# Patient Record
Sex: FEMALE | Race: WHITE
[De-identification: names, ages, dates, MRNs, and addresses within clinical notes are randomized per-mention and may not be internally consistent; named-entity substitution may affect disease eponyms.]

---

## 2017-04-11 ENCOUNTER — HOSPITAL ENCOUNTER (INPATIENT)
Dept: HOSPITAL 61 - ER | Age: 42
LOS: 2 days | Discharge: HOME | DRG: 385 | End: 2017-04-13
Attending: INTERNAL MEDICINE | Admitting: INTERNAL MEDICINE
Payer: COMMERCIAL

## 2017-04-11 VITALS — HEIGHT: 62 IN | BODY MASS INDEX: 21.53 KG/M2 | WEIGHT: 117 LBS

## 2017-04-11 DIAGNOSIS — K64.9: ICD-10-CM

## 2017-04-11 DIAGNOSIS — F41.9: ICD-10-CM

## 2017-04-11 DIAGNOSIS — E87.6: ICD-10-CM

## 2017-04-11 DIAGNOSIS — Z90.49: ICD-10-CM

## 2017-04-11 DIAGNOSIS — K50.10: Primary | ICD-10-CM

## 2017-04-11 DIAGNOSIS — Z98.51: ICD-10-CM

## 2017-04-11 DIAGNOSIS — E86.0: ICD-10-CM

## 2017-04-11 DIAGNOSIS — K21.9: ICD-10-CM

## 2017-04-11 DIAGNOSIS — Z90.79: ICD-10-CM

## 2017-04-11 DIAGNOSIS — N20.0: ICD-10-CM

## 2017-04-11 DIAGNOSIS — F32.9: ICD-10-CM

## 2017-04-11 DIAGNOSIS — N17.0: ICD-10-CM

## 2017-04-11 DIAGNOSIS — F17.210: ICD-10-CM

## 2017-04-11 LAB
ALBUMIN SERPL-MCNC: 3.6 G/DL (ref 3.4–5)
ALBUMIN/GLOB SERPL: 0.8 {RATIO} (ref 1–1.7)
ALP SERPL-CCNC: 132 U/L (ref 46–116)
ALT SERPL-CCNC: 19 U/L (ref 14–59)
ANION GAP SERPL CALC-SCNC: 18 MMOL/L (ref 6–14)
AST SERPL-CCNC: 22 U/L (ref 15–37)
BACTERIA #/AREA URNS HPF: (no result) /HPF
BASOPHILS # BLD AUTO: 0.1 X10^3/UL (ref 0–0.2)
BASOPHILS NFR BLD: 1 % (ref 0–3)
BILIRUB SERPL-MCNC: 0.4 MG/DL (ref 0.2–1)
BILIRUB UR QL STRIP: (no result)
BUN SERPL-MCNC: 12 MG/DL (ref 7–20)
BUN/CREAT SERPL: 7 (ref 6–20)
CALCIUM SERPL-MCNC: 9.1 MG/DL (ref 8.5–10.1)
CHLORIDE SERPL-SCNC: 94 MMOL/L (ref 98–107)
CO2 SERPL-SCNC: 21 MMOL/L (ref 21–32)
CREAT SERPL-MCNC: 1.8 MG/DL (ref 0.6–1)
EOSINOPHIL NFR BLD: 0 % (ref 0–3)
ERYTHROCYTE [DISTWIDTH] IN BLOOD BY AUTOMATED COUNT: 15.7 % (ref 11.5–14.5)
GFR SERPLBLD BASED ON 1.73 SQ M-ARVRAT: 31.2 ML/MIN
GLOBULIN SER-MCNC: 4.6 G/DL (ref 2.2–3.8)
GLUCOSE SERPL-MCNC: 89 MG/DL (ref 70–99)
GLUCOSE UR STRIP-MCNC: NEGATIVE MG/DL
HCT VFR BLD CALC: 47 % (ref 36–47)
HGB BLD-MCNC: 15.5 G/DL (ref 12–15.5)
LYMPHOCYTES # BLD: 2.2 X10^3/UL (ref 1–4.8)
LYMPHOCYTES NFR BLD AUTO: 22 % (ref 24–48)
MCH RBC QN AUTO: 30 PG (ref 25–35)
MCHC RBC AUTO-ENTMCNC: 33 G/DL (ref 31–37)
MCV RBC AUTO: 90 FL (ref 79–100)
MONOCYTES NFR BLD: 10 % (ref 0–9)
NEG OBC SER: (no result)
NEUTROPHILS NFR BLD AUTO: 67 % (ref 31–73)
NITRITE UR QL STRIP: NEGATIVE
PH UR STRIP: 6.5 [PH]
PLATELET # BLD AUTO: 495 X10^3/UL (ref 140–400)
POS OBC SER: (no result)
POTASSIUM SERPL-SCNC: 2.5 MMOL/L (ref 3.5–5.1)
PROT SERPL-MCNC: 8.2 G/DL (ref 6.4–8.2)
PROT UR STRIP-MCNC: NEGATIVE MG/DL
RBC # BLD AUTO: 5.23 X10^6/UL (ref 3.5–5.4)
RBC #/AREA URNS HPF: (no result) /HPF (ref 0–2)
SODIUM SERPL-SCNC: 133 MMOL/L (ref 136–145)
SP GR UR STRIP: 1.01
SQUAMOUS #/AREA URNS LPF: (no result) /LPF
UROBILINOGEN UR-MCNC: 0.2 MG/DL
WBC # BLD AUTO: 9.7 X10^3/UL (ref 4–11)
WBC #/AREA URNS HPF: (no result) /HPF (ref 0–4)

## 2017-04-11 PROCEDURE — 83690 ASSAY OF LIPASE: CPT

## 2017-04-11 PROCEDURE — 85027 COMPLETE CBC AUTOMATED: CPT

## 2017-04-11 PROCEDURE — 74022 RADEX COMPL AQT ABD SERIES: CPT

## 2017-04-11 PROCEDURE — 96376 TX/PRO/DX INJ SAME DRUG ADON: CPT

## 2017-04-11 PROCEDURE — 84703 CHORIONIC GONADOTROPIN ASSAY: CPT

## 2017-04-11 PROCEDURE — 87086 URINE CULTURE/COLONY COUNT: CPT

## 2017-04-11 PROCEDURE — 85007 BL SMEAR W/DIFF WBC COUNT: CPT

## 2017-04-11 PROCEDURE — 96361 HYDRATE IV INFUSION ADD-ON: CPT

## 2017-04-11 PROCEDURE — 74176 CT ABD & PELVIS W/O CONTRAST: CPT

## 2017-04-11 PROCEDURE — 80053 COMPREHEN METABOLIC PANEL: CPT

## 2017-04-11 PROCEDURE — 96375 TX/PRO/DX INJ NEW DRUG ADDON: CPT

## 2017-04-11 PROCEDURE — 96374 THER/PROPH/DIAG INJ IV PUSH: CPT

## 2017-04-11 PROCEDURE — 87045 FECES CULTURE AEROBIC BACT: CPT

## 2017-04-11 PROCEDURE — 87324 CLOSTRIDIUM AG IA: CPT

## 2017-04-11 PROCEDURE — 81001 URINALYSIS AUTO W/SCOPE: CPT

## 2017-04-11 PROCEDURE — 81025 URINE PREGNANCY TEST: CPT

## 2017-04-11 PROCEDURE — 36415 COLL VENOUS BLD VENIPUNCTURE: CPT

## 2017-04-11 RX ADMIN — BACITRACIN SCH MLS/HR: 5000 INJECTION, POWDER, FOR SOLUTION INTRAMUSCULAR at 22:24

## 2017-04-12 VITALS — SYSTOLIC BLOOD PRESSURE: 102 MMHG | DIASTOLIC BLOOD PRESSURE: 73 MMHG

## 2017-04-12 VITALS — SYSTOLIC BLOOD PRESSURE: 96 MMHG | DIASTOLIC BLOOD PRESSURE: 63 MMHG

## 2017-04-12 VITALS — DIASTOLIC BLOOD PRESSURE: 81 MMHG | SYSTOLIC BLOOD PRESSURE: 126 MMHG

## 2017-04-12 VITALS — SYSTOLIC BLOOD PRESSURE: 110 MMHG | DIASTOLIC BLOOD PRESSURE: 74 MMHG

## 2017-04-12 VITALS — SYSTOLIC BLOOD PRESSURE: 86 MMHG | DIASTOLIC BLOOD PRESSURE: 50 MMHG

## 2017-04-12 VITALS — DIASTOLIC BLOOD PRESSURE: 59 MMHG | SYSTOLIC BLOOD PRESSURE: 96 MMHG

## 2017-04-12 VITALS — DIASTOLIC BLOOD PRESSURE: 64 MMHG | SYSTOLIC BLOOD PRESSURE: 96 MMHG

## 2017-04-12 VITALS — SYSTOLIC BLOOD PRESSURE: 93 MMHG | DIASTOLIC BLOOD PRESSURE: 54 MMHG

## 2017-04-12 VITALS — DIASTOLIC BLOOD PRESSURE: 62 MMHG | SYSTOLIC BLOOD PRESSURE: 95 MMHG

## 2017-04-12 VITALS — DIASTOLIC BLOOD PRESSURE: 60 MMHG | SYSTOLIC BLOOD PRESSURE: 91 MMHG

## 2017-04-12 LAB
ALBUMIN SERPL-MCNC: 2.2 G/DL (ref 3.4–5)
ALBUMIN SERPL-MCNC: 2.5 G/DL (ref 3.4–5)
ALBUMIN/GLOB SERPL: 0.7 {RATIO} (ref 1–1.7)
ALBUMIN/GLOB SERPL: 0.8 {RATIO} (ref 1–1.7)
ALP SERPL-CCNC: 80 U/L (ref 46–116)
ALP SERPL-CCNC: 90 U/L (ref 46–116)
ALT SERPL-CCNC: 12 U/L (ref 14–59)
ALT SERPL-CCNC: 14 U/L (ref 14–59)
ANION GAP SERPL CALC-SCNC: 14 MMOL/L (ref 6–14)
ANION GAP SERPL CALC-SCNC: 9 MMOL/L (ref 6–14)
AST SERPL-CCNC: 15 U/L (ref 15–37)
AST SERPL-CCNC: 16 U/L (ref 15–37)
BASOPHILS # BLD AUTO: 0.1 X10^3/UL (ref 0–0.2)
BASOPHILS NFR BLD: 1 % (ref 0–3)
BILIRUB SERPL-MCNC: 0.2 MG/DL (ref 0.2–1)
BILIRUB SERPL-MCNC: 0.3 MG/DL (ref 0.2–1)
BUN SERPL-MCNC: 11 MG/DL (ref 7–20)
BUN SERPL-MCNC: 7 MG/DL (ref 7–20)
BUN/CREAT SERPL: 7 (ref 6–20)
BUN/CREAT SERPL: 8 (ref 6–20)
CALCIUM SERPL-MCNC: 7.4 MG/DL (ref 8.5–10.1)
CALCIUM SERPL-MCNC: 7.5 MG/DL (ref 8.5–10.1)
CHLORIDE SERPL-SCNC: 103 MMOL/L (ref 98–107)
CHLORIDE SERPL-SCNC: 109 MMOL/L (ref 98–107)
CO2 SERPL-SCNC: 18 MMOL/L (ref 21–32)
CO2 SERPL-SCNC: 21 MMOL/L (ref 21–32)
CREAT SERPL-MCNC: 1 MG/DL (ref 0.6–1)
CREAT SERPL-MCNC: 1.3 MG/DL (ref 0.6–1)
EOSINOPHIL NFR BLD: 1 % (ref 0–3)
ERYTHROCYTE [DISTWIDTH] IN BLOOD BY AUTOMATED COUNT: 15.2 % (ref 11.5–14.5)
GFR SERPLBLD BASED ON 1.73 SQ M-ARVRAT: 45.4 ML/MIN
GFR SERPLBLD BASED ON 1.73 SQ M-ARVRAT: 61.4 ML/MIN
GLOBULIN SER-MCNC: 3 G/DL (ref 2.2–3.8)
GLOBULIN SER-MCNC: 3.3 G/DL (ref 2.2–3.8)
GLUCOSE SERPL-MCNC: 68 MG/DL (ref 70–99)
GLUCOSE SERPL-MCNC: 80 MG/DL (ref 70–99)
HCT VFR BLD CALC: 36 % (ref 36–47)
HGB BLD-MCNC: 11.8 G/DL (ref 12–15.5)
LYMPHOCYTES # BLD: 2.6 X10^3/UL (ref 1–4.8)
LYMPHOCYTES NFR BLD AUTO: 33 % (ref 24–48)
MCH RBC QN AUTO: 30 PG (ref 25–35)
MCHC RBC AUTO-ENTMCNC: 33 G/DL (ref 31–37)
MCV RBC AUTO: 91 FL (ref 79–100)
MONOCYTES NFR BLD: 13 % (ref 0–9)
NEUTROPHILS NFR BLD AUTO: 52 % (ref 31–73)
PLATELET # BLD AUTO: 345 X10^3/UL (ref 140–400)
POTASSIUM SERPL-SCNC: 3.2 MMOL/L (ref 3.5–5.1)
POTASSIUM SERPL-SCNC: 3.4 MMOL/L (ref 3.5–5.1)
PROT SERPL-MCNC: 5.2 G/DL (ref 6.4–8.2)
PROT SERPL-MCNC: 5.8 G/DL (ref 6.4–8.2)
RBC # BLD AUTO: 3.97 X10^6/UL (ref 3.5–5.4)
SODIUM SERPL-SCNC: 135 MMOL/L (ref 136–145)
SODIUM SERPL-SCNC: 139 MMOL/L (ref 136–145)
WBC # BLD AUTO: 7.7 X10^3/UL (ref 4–11)

## 2017-04-12 RX ADMIN — ESCITALOPRAM OXALATE SCH MG: 5 TABLET, FILM COATED ORAL at 08:53

## 2017-04-12 RX ADMIN — NICOTINE SCH PATCH: 14 PATCH, EXTENDED RELEASE TOPICAL at 01:30

## 2017-04-12 RX ADMIN — Medication SCH TAB: at 09:00

## 2017-04-12 RX ADMIN — METHYLPREDNISOLONE SODIUM SUCCINATE SCH MG: 40 INJECTION, POWDER, FOR SOLUTION INTRAMUSCULAR; INTRAVENOUS at 15:43

## 2017-04-12 RX ADMIN — ONDANSETRON SCH MG: 4 TABLET, ORALLY DISINTEGRATING ORAL at 06:00

## 2017-04-12 RX ADMIN — PREDNISONE SCH MG: 20 TABLET ORAL at 09:00

## 2017-04-12 RX ADMIN — ALPRAZOLAM SCH MG: 0.25 TABLET ORAL at 08:53

## 2017-04-12 RX ADMIN — Medication SCH TAB: at 20:59

## 2017-04-12 RX ADMIN — BACITRACIN SCH MLS/HR: 5000 INJECTION, POWDER, FOR SOLUTION INTRAMUSCULAR at 05:21

## 2017-04-12 RX ADMIN — BACITRACIN SCH MLS/HR: 5000 INJECTION, POWDER, FOR SOLUTION INTRAMUSCULAR at 15:42

## 2017-04-12 RX ADMIN — ONDANSETRON SCH MG: 4 TABLET, ORALLY DISINTEGRATING ORAL at 12:30

## 2017-04-12 RX ADMIN — METHYLPREDNISOLONE SODIUM SUCCINATE SCH MG: 40 INJECTION, POWDER, FOR SOLUTION INTRAMUSCULAR; INTRAVENOUS at 20:58

## 2017-04-12 RX ADMIN — NICOTINE SCH PATCH: 14 PATCH, EXTENDED RELEASE TOPICAL at 08:54

## 2017-04-12 RX ADMIN — ONDANSETRON SCH MG: 4 TABLET, ORALLY DISINTEGRATING ORAL at 17:38

## 2017-04-12 NOTE — ACF
Admission Forms Criteria


                                                                         


                           ABDOMINAL PAIN





Clinical Indications for Admission to Inpatient Care


 


                                                                               

      (Place 'X' for any and all applicable criteria):





Admission is indicated for ANY ONE of the following(1)(2)(3)(4)(5):


[X]I.      Inpatient admission required rather than observation care (Also use 

Abdominal Pain: Observation Care, 


           as appropriate) because of ANY ONE of the following:


            [ ]a)   Severe pain requiring acute inpatient management


            [ ]b)    Identification of etiology/finding that requires inpatient 

care (eg, aortic dissection, free air)


            [ ]c)    Absent bowel sounds with complete ileus(6)  


            [ ]d)    Suspected toxic megacolon


            [X]e)    Severe electrolyte abnormalities requiring inpatient care


            [ ]f)     High fever or infection requiring inpatient admission as 

indicated by ANY ONE of following(7)(8):


                       [ ] i)    Appropriate outpatient or observational care 

antimicrobial treatment unavailable, not effective, or not feasible


                       [ ] ii)   Documented bacteremia 


                       [ ] iii)  Temperature > 104.9 degrees F (oral)


                       [ ] iv)  T >103.1 F (oral) or < 96.8 F(rectal) that does 

not respond to all emergency treatment measures


            [ ]g)     Signs of intestinal obstruction [B] 


            [ ]h)     Hemodynamic instability


            [ ]i)      IV fluid to replace significant ongoing losses (greater 

than 3 L/m2 per day) (12)(13)


            [ ]j)      Percutaneous or open drainage (eg, abscess, biliary tract

) procedures


            [ ]k)     Parenteral nutrition regimen that must be implemented on 

inpatient basis   


            [ ]l)      Other condition,treatment or monitoring requiring 

inpatient admission.


[ ]II.      Peritoneal signs present


[ ]III.     Surgery needed that cannot be performed on an ambulatory basis.


[ ]IV.    Evaluation requires patient to not eat or drink for extended period (

eg, more than 24 hours).





[ ]V.     Contraindications and/or Inappropriate clinical situations for 

Observational Care in patients with


           abdominal pain, when ANY ONE of the following is required:


           [ ]a)  Thorough evaluation is required to prevent catastrophic 

events due to delays in diagnosing  


                   (e.g.Mesenteric ischemia) 1,3


           [ ]b)  Patient with severe pathology or with chronic symptoms 

unlikely to improve in the ED stay (3)


[ ]VI.    General contraindications and/or Inappropriate clinical situations 

for Observational Care in patients


           with abdominal pain, when ANY ONE of the following is required:


           [ ]a)   Prediction of prolongation of LOS based on ANY ONE of the 

following may be considered as 


                    a contraindication for observational care 2, 3, 4, 5, 6, 7, 

8, 9, 10, 11


                    [ ]i)    Age > 65 yrs.


                    [ ]ii)   Patient arriving by ambulance


                    [ ]iii)  Patient with high acuity


                    [ ]iv)  Patient requiring vital sign monitoring


                    [ ]v)   Patient on IV medication


           [ ]b)   Systolic blood pressures  180mmHg 3,12


           [ ]c)   Patient with altered mental status including delirium and 

other alteration of consciousness, (3)


           [ ]d)   Patient whose discharge disposition will be to a skilled 

nursing home or rehabilitation home should 


                    not be managed in Emergency Department Observation Unit. 

CMS rule requires 3 days hospital stay before such placement.3,13


           [ ]e)   Patient with failure to thrive due to broad array of 

etiologies 3,16,17


           [ ]f)    Inability to ambulate 3,14








Extended stay beyond goal length of stay may be needed for(2)(3):


[ ]a)   Persistent abdominal pain with suspected intra-abdominal process


[ ]b)   Diagnosed condition requiring continued stay (e.g., pancreatitis, 

complicated diverticulitis)                                            


[ ]c)   Surgery (e.g., colectomy)                


    





The original [employer] content created by [employer] has been revised. 


The portions of the content which have been revised are identified through the 

use of italic text or in bold, and [facility] 


has neither reviewed nor approved the modified material.All other unmodified 

content is copyright  [employer].





Please see references footnoted in the original [employer] edition 

2016


Admission Criteria Met?:  Yes








SADA SUAREZ Apr 12, 2017 00:31

## 2017-04-12 NOTE — PHYS DOC
Past Medical History


Past Medical History:  Other


Additional Past Medical Histor:  CROHNS


Past Surgical History:  Colectomy, Other


Additional Past Surgical Histo:  BOWEL RESECTION


Alcohol Use:  Heavy


Drug Use:  None





Adult General


Chief Complaint


Chief Complaint:  ABDOMINAL PAIN





HPI


HPI


Patient is a 41  year old female who has a history significant for Crohn's 

colitis who presents here today complaining of pain in her stomach. Patient 

denies any history of hypertension diabetes CHF COPD liver longer kidney 

problems. Patient is status post a colectomy and resection as well as a 

bilateral tubal ligation. Patient reports she smokes and drinks but does not do 

any drugs. Patient is not allergic to any medications. Patient reports the pain 

started approximately 1-2 days ago is been getting progressively worse. She 

denies any fevers shakes chills. Patient denies any diarrhea that's out of the 

ordinary for her. Patient reports that she usually has loose stools. Patient 

reports she's had nausea and vomiting. Patient denies any melena. Patient does 

report occasional slight amount of blood in her stools which she thinks is from 

her hemorrhoids. Patient denies any hematemesis. Patient has a dysuria 

frequency or urgency. Patient denies any URI symptoms. Patient reports that she 

has been unable to keep down much liquids or food over the last several days.





Patient's physical exam was significant for diffuse mild tenderness to 

palpation. Patient had no rebound or guarding. Patient has normal active bowel 

sounds. Patient was her  signs. Patient does not present with any signs 

or symptoms of be consistent with an acute surgical abdomen. Patient's mucous 

membranes are moist. Patient is nontoxic appearing.





She's ER workup was significant for labs revealed a significant hypokalemia. 

Patient's potassium level was 2.3.


Patient's x-rays were unremarkable. There was no free air or air-fluid levels. 

There was a paucity of gas on the x-ray.





A/P #1 Crohn's colitis exacerbation. Patient is clinically hemodynamically 

stable. Patient was given pain medicines in the ED with some resolution of her 

discomfort. Patient did have a significant hypokalemia that will need to be 

treated aggressively in the ED as well as inpatient. Patient's potassium level 

was 2.3 and she did complain of some generalized weakness. Patient was given by 

mouth potassium as well as repleted through IV potassium as well. Patient be 

admitted to the hospital on, treated for further repletion of her potassium and 

monitoring and treatment of her abdominal pain and Crohn's colitis.





Review of Systems


Review of Systems





Constitutional: Denies fever or chills []


Eyes: Denies change in visual acuity, redness, or eye pain []


All other review systems are negative except as documented in the history of 

present illness portion.





Current Medications


Current Medications





 Current Medications








 Medications


  (Trade)  Dose


 Ordered  Sig/Eugenio  Start Time


 Stop Time Status Last Admin


Dose Admin


 


 Hydromorphone HCl


 1 mg  1 mg  1X  ONCE  4/11/17 21:45


 4/11/17 21:46 DC 4/11/17 21:47


1 MG


 


 Ondansetron HCl


  (Zofran)  4 mg  1X  ONCE  4/11/17 20:45


 4/11/17 21:12 DC 4/11/17 21:18


4 MG


 


 Potassium


 Chloride/Sodium


 Chloride


  (KCl 40 Meq-NS


 1,000 ml Iv Soln)  1,000 ml @ 


 75 mls/hr  1X  ONCE  4/11/17 22:00


 4/12/17 11:19  4/11/17 22:22


75 MLS/HR


 


 Sodium Chloride


  (Iv Sodium


 Chloride 0.9%


 1000ml Bag)  1,000 ml @ 


 1,000 mls/hr  Q1H  4/11/17 21:08


 4/11/17 22:07 DC 4/11/17 21:17


1,000 MLS/HR











Allergies


Allergies





 Allergies








Coded Allergies Type Severity Reaction Last Updated Verified


 


  No Known Drug Allergies    7/1/16 No











Physical Exam


Physical Exam





Constitutional: Well developed, well nourished, no acute distress, non-toxic 

appearance. []


HENT: Normocephalic, atraumatic, bilateral external ears normal, oropharynx 

moist, no oral exudates, nose normal. []


Eyes: PERRLA, EOMI, conjunctiva normal, no discharge. [] 


Neck: Normal range of motion, no tenderness, supple, no stridor. [] 


Cardiovascular:Heart rate regular rhythm, no murmur []


Lungs & Thorax:  Bilateral breath sounds clear to auscultation []


Back: No tenderness, no CVA tenderness. [] 


Extremities: No tenderness, no cyanosis, no clubbing, ROM intact, no edema. [] 


Neurologic: Alert and oriented X 3, normal motor function, normal sensory 

function, no focal deficits noted. []


Psychologic: Affect normal, judgement normal, mood normal. []





Current Patient Data


Vital Signs





 Vital Signs








  Date Time  Temp Pulse Resp B/P Pulse Ox O2 Delivery O2 Flow Rate FiO2


 


4/11/17 21:58  95  135/87 98 Room Air  


 


4/11/17 21:47   16     


 


4/11/17 20:00 96.6       





 96.6       








Lab Values





 Laboratory Tests








Test


  4/11/17


20:36


 


White Blood Count


  9.7x10^3/uL


(4.0-11.0)


 


Red Blood Count


  5.23x10^6/uL


(3.50-5.40)


 


Hemoglobin


  15.5g/dL


(12.0-15.5)


 


Hematocrit


  47.0%


(36.0-47.0)


 


Mean Corpuscular Volume 90fL ()  


 


Mean Corpuscular Hemoglobin 30pg (25-35)  


 


Mean Corpuscular Hemoglobin


Concent 33g/dL (31-37)


 


 


Red Cell Distribution Width


  15.7%


(11.5-14.5)  H


 


Platelet Count


  495x10^3/uL


(140-400)  H


 


Neutrophils (%) (Auto) 67% (31-73)  


 


Lymphocytes (%) (Auto) 22% (24-48)  L


 


Monocytes (%) (Auto) 10% (0-9)  H


 


Eosinophils (%) (Auto) 0% (0-3)  


 


Basophils (%) (Auto) 1% (0-3)  


 


Neutrophils # (Auto)


  6.5x10^3uL


(1.8-7.7)


 


Lymphocytes # (Auto)


  2.2x10^3/uL


(1.0-4.8)


 


Monocytes # (Auto)


  0.9x10^3/uL


(0.0-1.1)


 


Eosinophils # (Auto)


  0.0x10^3/uL


(0.0-0.7)


 


Basophils # (Auto)


  0.1x10^3/uL


(0.0-0.2)


 


Urine Collection Type Unknown  


 


Urine Color Yellow  


 


Urine Clarity Clear  


 


Urine pH 6.5  


 


Urine Specific Gravity 1.010  


 


Urine Protein


  Negativemg/dL


(NEG-TRACE)


 


Urine Glucose (UA)


  Negativemg/dL


(NEG)


 


Urine Ketones (Stick)


  Tracemg/dL


(NEG)


 


Urine Blood Trace (NEG)  


 


Urine Nitrite


  Negative (NEG)


 


 


Urine Bilirubin


  Moderate (NEG)


 


 


Urine Urobilinogen Dipstick


  0.2mg/dL (0.2


mg/dL)


 


Urine Leukocyte Esterase Trace (NEG)  


 


Urine RBC 3-5/HPF (0-2)  


 


Urine WBC


  5-10/HPF (0-4)


 


 


Urine Squamous Epithelial


Cells Many/LPF  


 


 


Urine Bacteria


  Few/HPF


(0-FEW)


 


Sodium Level


  133mmol/L


(136-145)  L


 


Potassium Level


  2.5mmol/L


(3.5-5.1)  *L


 


Chloride Level


  94mmol/L


()  L


 


Carbon Dioxide Level


  21mmol/L


(21-32)


 


Anion Gap 18 (6-14)  H


 


Blood Urea Nitrogen


  12mg/dL (7-20)


 


 


Creatinine


  1.8mg/dL


(0.6-1.0)  H


 


Estimated GFR


(Cockcroft-Gault) 31.2  


 


 


BUN/Creatinine Ratio 7 (6-20)  


 


Glucose Level


  89mg/dL


(70-99)


 


Calcium Level


  9.1mg/dL


(8.5-10.1)


 


Total Bilirubin


  0.4mg/dL


(0.2-1.0)


 


Aspartate Amino Transferase


(AST) 22U/L (15-37)  


 


 


Alanine Aminotransferase (ALT) 19U/L (14-59)  


 


Alkaline Phosphatase


  132U/L


()  H


 


Total Protein


  8.2g/dL


(6.4-8.2)


 


Albumin


  3.6g/dL


(3.4-5.0)


 


Albumin/Globulin Ratio


  0.8 (1.0-1.7)


L


 


Lipase


  189U/L


()


 


Serum Pregnancy Test,


Qualitative Negative (NEG)


 





 Laboratory Tests


4/11/17 20:36








 Laboratory Tests


4/11/17 20:36














EKG


EKG


[]





Radiology/Procedures


Radiology/Procedures


[]





Course & Med Decision Making


Course & Med Decision Making


Pertinent Labs and Imaging studies reviewed. (See chart for details)





[]





Dragon Disclaimer


Dragon Disclaimer


This electronic medical record was generated, in whole or in part, using a 

voice recognition dictation system.





Departure


Departure


Impression:  


 Primary Impression:  


 Intractable abdominal pain


 Additional Impressions:  


 Nausea & vomiting


 Hypokalemia


 Dehydration


 Crohns disease


Disposition:  09 ADMITTED AS INPATIENT


Admitting Physician:  Other (Reusch)


Condition:  STABLE


Referrals:  


NO PCP (PCP)





Problem Qualifiers








MICHAEL BASS MD Apr 12, 2017 01:24

## 2017-04-12 NOTE — RAD
Two-view abdominal series and PA view chest x-ray



Clinical indications: Left-sided abdominal pain. Crohn's disease.



Findings: No gas is identified within the bowel. No air-fluid levels are seen

within the bowel. No free intraperitoneal air is seen. Vascular calcifications

are seen within the pelvis.



Chest x-ray demonstrates no acute lung infiltrate or pleural effusion or

pulmonary edema or pneumothorax. The heart size and pulmonary vasculature and

mediastinum and both dread are unremarkable.



IMPRESSION: Gasless abdomen. This may be be seen with bowel obstruction with

fluid-filled bowel loops.



No free air.



Chest x-ray demonstrates no acute lung infiltrate.

## 2017-04-12 NOTE — PDOC2
GI CONSULT


Reason For Consult:


Crohn's, abd pain





HPI:


HPI:


40 y/o white female admitted through the ER.  Per RN, IPC ordering GI consult.





GI history:


Crohn's disease diagnosed ~15 years ago.  Initially on Remicade, stopped for 

"allergy," then was switched to oral medication w/o good response.  


Had ileocolonic resection 8-9 years ago.  Afterwards treated w/ Humira which 

she didn't like, switched back to an oral med which wasn't tolerated.


Since then takes intermittent prednisone for flare symptoms (estimates 3-4 

times yearly).  Last colonoscopy ~4 years ago.


Last seen here for same in 12/2016.  At that time, checked TPMT (above normal) 

and advised outpt follow-up to discuss Imuran which she has not done.


Past imaging here as below.


Still smokes 1/2 ppd, drinks 2 glasses of wine each night.





Off of prednisone since 12/2016.  Flare symptoms recurred 2-3 weeks ago.  She 

describes 5-6 watery stools daily (not unlike her normal pattern), occasional 

rectal bleeding, mid to left-sided abd pain, and n/v.  Takes Excedrin QD.  

Currently tolerating clear liquids.





Significant labs: Hgb 15.5 to 1.8 (similar to admission last year), K+ 2.5 to 

3.4, Cr 1.8 to 1.3.  Acute abd series shows gasless abdomen.





PMH:


PMH:


Crohn's, GERD, anxiety/depression, ileocolonic resection, tubal ligation, 

unilateral salpingo-oophorectomy, left wrist surgery, tonsillectomy





FH:


Family History:  Other (PGF had "a colon issue with surgery")





Social History:


Smoke:  <1 pack per day (1/2 ppd)


ALCOHOL:  other (2 glasses of wine each night)


Drugs:  None





ROS:





GEN: Denies fevers, chills, sweats


HEENT: Denies blurred vision, sore throat


CV: Denies chest pain


RESP: Denies shortness of air, cough


GI: Per HPI


: Denies hematuria, dysuria


ENDO: Denies weight changes


NEURO: Denies confusion, dizziness


MSK: Denies weakness, joint pain/swelling


SKIN: Denies jaundice, pruritus





VItals:


Vitals:





 Vital Signs








  Date Time  Temp Pulse Resp B/P Pulse Ox O2 Delivery O2 Flow Rate FiO2


 


4/12/17 10:47 97.9 91 16 96/59 97 Room Air  





 97.9       











Labs:


Labs:





Laboratory Tests








Test


  4/11/17


19:46 4/11/17


20:36 4/12/17


03:15


 


Bedside Urine HCG, Qualitative


  Hcg negative


(Negative) 


  


 


 


White Blood Count


  


  9.7x10^3/uL


(4.0-11.0) 7.7x10^3/uL


(4.0-11.0)


 


Red Blood Count


  


  5.23x10^6/uL


(3.50-5.40) 3.97x10^6/uL


(3.50-5.40)


 


Hemoglobin


  


  15.5g/dL


(12.0-15.5) 11.8g/dL


(12.0-15.5)


 


Hematocrit


  


  47.0%


(36.0-47.0) 36.0%


(36.0-47.0)


 


Mean Corpuscular Volume  90fL ()  91fL () 


 


Mean Corpuscular Hemoglobin  30pg (25-35)  30pg (25-35) 


 


Mean Corpuscular Hemoglobin


Concent 


  33g/dL (31-37) 


  33g/dL (31-37) 


 


 


Red Cell Distribution Width


  


  15.7%


(11.5-14.5) 15.2%


(11.5-14.5)


 


Platelet Count


  


  495x10^3/uL


(140-400) 345x10^3/uL


(140-400)


 


Neutrophils (%) (Auto)  67% (31-73)  52% (31-73) 


 


Lymphocytes (%) (Auto)  22% (24-48)  33% (24-48) 


 


Monocytes (%) (Auto)  10% (0-9)  13% (0-9) 


 


Eosinophils (%) (Auto)  0% (0-3)  1% (0-3) 


 


Basophils (%) (Auto)  1% (0-3)  1% (0-3) 


 


Neutrophils # (Auto)


  


  6.5x10^3uL


(1.8-7.7) 4.0x10^3uL


(1.8-7.7)


 


Lymphocytes # (Auto)


  


  2.2x10^3/uL


(1.0-4.8) 2.6x10^3/uL


(1.0-4.8)


 


Monocytes # (Auto)


  


  0.9x10^3/uL


(0.0-1.1) 1.0x10^3/uL


(0.0-1.1)


 


Eosinophils # (Auto)


  


  0.0x10^3/uL


(0.0-0.7) 0.1x10^3/uL


(0.0-0.7)


 


Basophils # (Auto)


  


  0.1x10^3/uL


(0.0-0.2) 0.1x10^3/uL


(0.0-0.2)


 


Urine Collection Type  Unknown  


 


Urine Color  Yellow  


 


Urine Clarity  Clear  


 


Urine pH  6.5  


 


Urine Specific Gravity  1.010  


 


Urine Protein


  


  Negativemg/dL


(NEG-TRACE) 


 


 


Urine Glucose (UA)


  


  Negativemg/dL


(NEG) 


 


 


Urine Ketones (Stick)


  


  Tracemg/dL


(NEG) 


 


 


Urine Blood  Trace (NEG)  


 


Urine Nitrite  Negative (NEG)  


 


Urine Bilirubin  Moderate (NEG)  


 


Urine Urobilinogen Dipstick


  


  0.2mg/dL (0.2


mg/dL) 


 


 


Urine Leukocyte Esterase  Trace (NEG)  


 


Urine RBC  3-5/HPF (0-2)  


 


Urine WBC  5-10/HPF (0-4)  


 


Urine Squamous Epithelial


Cells 


  Many/LPF 


  


 


 


Urine Bacteria


  


  Few/HPF


(0-FEW) 


 


 


Sodium Level


  


  133mmol/L


(136-145) 135mmol/L


(136-145)


 


Potassium Level


  


  2.5mmol/L


(3.5-5.1) 3.4mmol/L


(3.5-5.1)


 


Chloride Level


  


  94mmol/L


() 103mmol/L


()


 


Carbon Dioxide Level


  


  21mmol/L


(21-32) 18mmol/L


(21-32)


 


Anion Gap  18 (6-14)  14 (6-14) 


 


Blood Urea Nitrogen  12mg/dL (7-20)  11mg/dL (7-20) 


 


Creatinine


  


  1.8mg/dL


(0.6-1.0) 1.3mg/dL


(0.6-1.0)


 


Estimated GFR


(Cockcroft-Gault) 


  31.2 


  45.4 


 


 


BUN/Creatinine Ratio  7 (6-20)  8 (6-20) 


 


Glucose Level


  


  89mg/dL


(70-99) 68mg/dL


(70-99)


 


Calcium Level


  


  9.1mg/dL


(8.5-10.1) 7.5mg/dL


(8.5-10.1)


 


Total Bilirubin


  


  0.4mg/dL


(0.2-1.0) 0.3mg/dL


(0.2-1.0)


 


Aspartate Amino Transf


(AST/SGOT) 


  22U/L (15-37) 


  16U/L (15-37) 


 


 


Alanine Aminotransferase


(ALT/SGPT) 


  19U/L (14-59) 


  14U/L (14-59) 


 


 


Alkaline Phosphatase


  


  132U/L


() 90U/L () 


 


 


Total Protein


  


  8.2g/dL


(6.4-8.2) 5.8g/dL


(6.4-8.2)


 


Albumin


  


  3.6g/dL


(3.4-5.0) 2.5g/dL


(3.4-5.0)


 


Albumin/Globulin Ratio  0.8 (1.0-1.7)  0.8 (1.0-1.7) 


 


Lipase


  


  189U/L


() 


 


 


Serum Pregnancy Test,


Qualitative 


  Negative (NEG) 


  


 











Allergies:


Coded Allergies:  


     No Known Drug Allergies (Unverified , 7/1/16)





Medications:





Current Medications








 Medications


  (Trade)  Dose


 Ordered  Sig/Eugenio


 Route


 PRN Reason  Start Time


 Stop Time Status Last Admin


Dose Admin


 


 Sodium Chloride


  (Iv Sodium


 Chloride 0.9%


 1000ml Bag)  1,000 ml @ 


 1,000 mls/hr  Q1H


 IV


   4/11/17 21:08


 4/11/17 22:07 DC 4/11/17 21:17


 


 


 Ondansetron HCl


  (Zofran)  4 mg  1X  ONCE


 IV


   4/11/17 20:45


 4/11/17 21:12 DC 4/11/17 21:18


 


 


 Hydromorphone HCl


  (Dilaudid)  1 mg  1X  ONCE


 IV


   4/11/17 21:45


 4/11/17 21:46 DC 4/11/17 21:47


 


 


 Potassium


 Chloride 40 meq  40 meq  1X  ONCE


 PO


   4/11/17 23:00


 4/11/17 23:01 DC 4/11/17 22:30


 


 


 Potassium


 Chloride/Sodium


 Chloride


  (KCl 40 Meq-NS


 1,000 ml Iv Soln)  1,000 ml @ 


 75 mls/hr  1X  ONCE


 IV


   4/11/17 22:00


 4/12/17 11:19 DC 4/11/17 22:22


 


 


 Ondansetron HCl


  (Zofran)  4 mg  PRN Q8HRS  PRN


 IV


 NAUSEA/VOMITING  4/11/17 22:15


 4/12/17 22:14  4/11/17 23:19


 


 


 Fentanyl Citrate


 50 mcg  50 mcg  PRN Q2HR  PRN


 IV


 PAIN  4/11/17 22:15


 4/12/17 22:14  4/11/17 23:23


 


 


 Sodium Chloride


  (Iv Sodium


 Chloride 0.9%


 1000ml Bag)  1,000 ml @ 


 125 mls/hr  Q8H


 IV


   4/11/17 22:15


 4/12/17 22:14  4/12/17 05:21


 


 


 Alprazolam


  (Xanax)  0.25 mg  DAILY


 PO


   4/12/17 09:00


    4/12/17 08:53


 


 


 Escitalopram


 Oxalate


  (Lexapro)  5 mg  DAILY


 PO


   4/12/17 09:00


    4/12/17 08:53


 


 


 Ondansetron HCl


  (Zofran Odt)  4 mg  Q6HRS


 PO


   4/12/17 06:00


    4/12/17 12:30


 


 


 Nicotine


  (Nicoderm Cq


 14mg)  1 patch  DAILY


 TD


   4/12/17 01:30


    4/12/17 08:54


 











Imaging:


Imaging:


CT A/P w/ oral and IV contrast 7/2016


IMPRESSION 


- Status post subtotal colectomy with anastomosis in the right lower quadrant. 

There is a 10-15 centimeter segment of small bowel just proximal to the 

anastomosis that shows wall thickening and mucosal enhancement, consistent with 

active Crohn disease. No evidence of perforation, 


obstruction or abscess.


- Borderline enlarged mesenteric lymph nodes, nonspecific but likely related to 

known Crohn disease.


- Otherwise no significant abnormality.





CT A/P w/ oral contrast 12/2016


IMPRESSION: No definite acute finding seen in the abdomen or pelvis. No 

definite small or large bowel pathology seen. Bilateral minute punctate renal 

calculi.





PE:





GEN: NAD


HEENT: Atraumatic, PERRL


LUNGS: CTAB


HEART: RRR


ABD: BS+, periumbilical /epigastric tenderness spreads lower, +scar


EXTREMITY: No edema


SKIN: No rashes, no jaundice


NEURO/PSYCH: A & O 3





A/P:


A/P:


Crohn's disease s/p ileocolonic resection


-has tried biologics (Remicade, Humira) and oral medications (presumably 

mesalamines, unsure of Imuran) in the past


-uses intermittent prednisone for flares 3-4 times yearly


-diagnosed 15 years ago, last colonoscopy 4 years ago, last saw a 

gastroenterologist in [place], [place] 2 years ago


-checked TPMT last admission (above normal)


-still smokes





Abdominal pain, n/v, diarrhea, hematochezia


-typical flare symptoms, onset 2-3 weeks ago


-x-ray w/ possible obstruction ---> no vomiting today, tolerating clears





HORACE





--


D/w Dr. Milton - start IV steroids, check CT A/P (oral contrast only w/ Cr 1.3

) r/o obstruction/stricture, and ask surgery to see.


?start Imuran








BO BENITEZ Apr 12, 2017 14:24

## 2017-04-12 NOTE — RAD
CT study of the abdomen and pelvis without IV contrast



Clinical indications: Crohn's disease. Status post resection. Abdominal pain

with nausea and vomiting.



Technique: Non-IV contrast helical CT scanning of the abdomen and pelvis was

performed. Without IV contrast, the sensitivity to detect organ pathology is

decreased. GI contrast was a  per mouth.



PQRS Compliance Statement:



One or more of the following individualized dose reduction techniques were

utilized for this examination:

1. Automated exposure control

2. Adjustment of the mA and/or kV according to patient size

3. Use of iterative reconstruction technique



Comparison: July 1, 2016.



Findings: The liver and spleen and pancreas are homogeneous in appearance on

this noncontrast study. The gallbladder is normal and no extra hepatic biliary

ductal dilatation is seen. No adrenal mass is seen. Tiny punctate stones are

seen within both kidneys mainly involving the medulla and therefore is

consistent with medullary sponge kidney. Otherwise no obstructing stone of

either kidney is seen. No hydronephrosis or hydroureter is seen. Multiple

calcified phleboliths are seen within the anatomic pelvis. Urinary bladder is

not distended. No renal mass is seen on either side on this noncontrast study.

No aneurysmal dilatation of the abdominal aorta is seen. Reactive mesenteric

lymph nodes are present within the anterior lower abdomen and the upper

pelvis. The patient has had a subtotal colectomy. There is diffuse wall

thickening of the entire colon and rectosigmoid region to the ileocolic

anastomosis. There is mild small bowel wall thickening just proximal to the

ileocolic anastomosis. No obstructive bowel pattern is seen. Contrast is seen

all the way down into the rectosigmoid region. No free air or free fluid is

seen. No osteolytic process is seen. No lung base infiltrate is seen.



IMPRESSION: History of subtotal colectomy. There is diffuse wall thickening of

the entire colon and rectosigmoid region. This may be seen with Crohn's

disease given the patient's history but typically, Crohn's disease is more

segmental. Therefore other causes of colitis including ulcerative colitis or

infection should be considered. There is some mild wall thickening of the

distal small bowel just proximal to the ileocolic anastomosis as well.

Reactive mesenteric lymphadenopathy is seen.

## 2017-04-13 VITALS — DIASTOLIC BLOOD PRESSURE: 54 MMHG | SYSTOLIC BLOOD PRESSURE: 92 MMHG

## 2017-04-13 VITALS — SYSTOLIC BLOOD PRESSURE: 92 MMHG | DIASTOLIC BLOOD PRESSURE: 54 MMHG

## 2017-04-13 VITALS — SYSTOLIC BLOOD PRESSURE: 109 MMHG | DIASTOLIC BLOOD PRESSURE: 79 MMHG

## 2017-04-13 VITALS — SYSTOLIC BLOOD PRESSURE: 163 MMHG | DIASTOLIC BLOOD PRESSURE: 53 MMHG

## 2017-04-13 VITALS — SYSTOLIC BLOOD PRESSURE: 84 MMHG | DIASTOLIC BLOOD PRESSURE: 51 MMHG

## 2017-04-13 LAB
BASOPHILS # BLD AUTO: 0 X10^3/UL (ref 0–0.2)
BASOPHILS NFR BLD: 0 % (ref 0–3)
EOSINOPHIL NFR BLD: 0 % (ref 0–3)
ERYTHROCYTE [DISTWIDTH] IN BLOOD BY AUTOMATED COUNT: 15.9 % (ref 11.5–14.5)
HCT VFR BLD CALC: 36.6 % (ref 36–47)
HGB BLD-MCNC: 12.2 G/DL (ref 12–15.5)
LYMPHOCYTES # BLD: 0.6 X10^3/UL (ref 1–4.8)
LYMPHOCYTES NFR BLD AUTO: 11 % (ref 24–48)
MCH RBC QN AUTO: 29 PG (ref 25–35)
MCHC RBC AUTO-ENTMCNC: 33 G/DL (ref 31–37)
MCV RBC AUTO: 88 FL (ref 79–100)
MONOCYTES NFR BLD: 2 % (ref 0–9)
NEUTROPHILS NFR BLD AUTO: 86 % (ref 31–73)
PLATELET # BLD AUTO: 349 X10^3/UL (ref 140–400)
PLATELET # BLD EST: ADEQUATE 10*3/UL
RBC # BLD AUTO: 4.16 X10^6/UL (ref 3.5–5.4)
WBC # BLD AUTO: 5.5 X10^3/UL (ref 4–11)

## 2017-04-13 RX ADMIN — NICOTINE SCH PATCH: 14 PATCH, EXTENDED RELEASE TOPICAL at 08:38

## 2017-04-13 RX ADMIN — METHYLPREDNISOLONE SODIUM SUCCINATE SCH MG: 40 INJECTION, POWDER, FOR SOLUTION INTRAMUSCULAR; INTRAVENOUS at 08:38

## 2017-04-13 RX ADMIN — ONDANSETRON SCH MG: 4 TABLET, ORALLY DISINTEGRATING ORAL at 00:00

## 2017-04-13 RX ADMIN — PREDNISONE SCH MG: 20 TABLET ORAL at 09:00

## 2017-04-13 RX ADMIN — ONDANSETRON SCH MG: 4 TABLET, ORALLY DISINTEGRATING ORAL at 06:00

## 2017-04-13 RX ADMIN — ONDANSETRON SCH MG: 4 TABLET, ORALLY DISINTEGRATING ORAL at 18:02

## 2017-04-13 RX ADMIN — Medication SCH TAB: at 09:00

## 2017-04-13 RX ADMIN — ESCITALOPRAM OXALATE SCH MG: 5 TABLET, FILM COATED ORAL at 08:37

## 2017-04-13 RX ADMIN — ONDANSETRON SCH MG: 4 TABLET, ORALLY DISINTEGRATING ORAL at 14:35

## 2017-04-13 RX ADMIN — ALPRAZOLAM SCH MG: 0.25 TABLET ORAL at 08:37

## 2017-04-13 NOTE — PDOC
Subjective:


Subjective:


Feeling much better today.  Tolerating clears, wants to advance.  Less abd 

pain.  Stooling the same as always.  Wants to leave.





Objective:


Objective:


D/w Dr. Marie RN.


Vital Signs:





 Vital Signs








  Date Time  Temp Pulse Resp B/P Pulse Ox O2 Delivery O2 Flow Rate FiO2


 


4/13/17 07:00 97.9 80 17 84/51 98 Room Air  





 97.9       








Labs:





 Laboratory Tests








Test


  4/12/17


14:50 4/13/17


03:45


 


Sodium Level 139mmol/L  


 


Potassium Level 3.2mmol/L  


 


Chloride Level 109mmol/L  


 


Carbon Dioxide Level 21mmol/L  


 


Anion Gap 9  


 


Blood Urea Nitrogen 7mg/dL  


 


Creatinine 1.0mg/dL  


 


Estimated GFR


(Cockcroft-Gault) 61.4 


  


 


 


BUN/Creatinine Ratio 7  


 


Glucose Level 80mg/dL  


 


Calcium Level 7.4mg/dL  


 


Total Bilirubin 0.2mg/dL  


 


Aspartate Amino Transf


(AST/SGOT) 15U/L 


  


 


 


Alanine Aminotransferase


(ALT/SGPT) 12U/L 


  


 


 


Alkaline Phosphatase 80U/L  


 


Total Protein 5.2g/dL  


 


Albumin 2.2g/dL  


 


Albumin/Globulin Ratio 0.7  


 


White Blood Count  5.5x10^3/uL 


 


Red Blood Count  4.16x10^6/uL 


 


Hemoglobin  12.2g/dL 


 


Hematocrit  36.6% 


 


Mean Corpuscular Volume  88fL 


 


Mean Corpuscular Hemoglobin  29pg 


 


Mean Corpuscular Hemoglobin


Concent 


  33g/dL 


 


 


Red Cell Distribution Width  15.9% 


 


Platelet Count  349x10^3/uL 


 


Neutrophils (%) (Auto)  86% 


 


Lymphocytes (%) (Auto)  11% 


 


Monocytes (%) (Auto)  2% 


 


Eosinophils (%) (Auto)  0% 


 


Basophils (%) (Auto)  0% 


 


Neutrophils # (Auto)  4.7x10^3uL 


 


Lymphocytes # (Auto)  0.6x10^3/uL 


 


Monocytes # (Auto)  0.1x10^3/uL 


 


Eosinophils # (Auto)  0.0x10^3/uL 


 


Basophils # (Auto)  0.0x10^3/uL 


 


Segmented Neutrophils %  70% 


 


Band Neutrophils %  20% 


 


Lymphocytes %  6% 


 


Atypical Lymphocytes %


(Manual) 


  1% 


 


 


Monocytes %  3% 


 


Platelet Estimate  Adequate 








Imaging:


CT A/P w/ oral contrast


IMPRESSION: History of subtotal colectomy. There is diffuse wall thickening of 

the entire colon and rectosigmoid region. This may be seen with Crohn's disease 

given the patient's history but typically, Crohn's disease is more segmental. 

Therefore other causes of colitis including ulcerative colitis or infection 

should be considered. There is some mild wall thickening of the distal small 

bowel just proximal to the ileocolic anastomosis as well. Reactive mesenteric 

lymphadenopathy is seen.





PE:





GEN: NAD


LUNGS: CTAB


HEART: RRR


ABD: NABS, S/ND/NT


NEURO/PSYCH: A & O 3





A/P:


Crohn's disease s/p ileocolonic resection


-has tried biologics (Remicade, Humira) and oral medications (presumably 

mesalamines, unsure of Imuran) in the past


-uses intermittent prednisone for flares


-diagnosed 15 years ago (says Crohn's colitis), last colonoscopy 4 years ago


-TPMT normal


-still smokes





Abdominal pain, n/v, diarrhea, hematochezia - improved


-typical flare symptoms, onset 2-3 weeks ago


-CT as above, stool studies pending





--


Better today.  Will advance diet.


Dr. Milton to see this afternoon.  ?change to PO steroids ?Imuran


Suggest GI follow-up as outpt, smoking cessation.








BO BENITEZ Apr 13, 2017 11:35

## 2017-04-14 NOTE — DS
DATE OF DISCHARGE:  04/13/2017



ADMISSION DIAGNOSIS:  Crohn's exacerbation.



DISCHARGE DIAGNOSIS:  Resolving Crohn's exacerbation.



HOSPITAL COURSE:  The patient is a pleasant 41-year-old female presented with

Crohn's exacerbation.  She was admitted.  We consulted with GI.  She was given

steroids and empiric antibiotics and fluids.  Today, she is doing better.  We

plan to discharge with close outpatient followup.



DISPOSITION:  Home.



ACTIVITY:  As tolerated.



DIET:  Low sodium.



MEDICATIONS:  Please see the MRAD.



TOTAL TIME:  32 minutes.

 



______________________________

SOTERO HERRERA DO



DR:  LORI/lobo  JOB#:  [id number] / [id number]

DD:  04/13/2017 19:07  DT:  04/14/2017 01:33

## 2018-12-12 ENCOUNTER — HOSPITAL ENCOUNTER (EMERGENCY)
Dept: HOSPITAL 61 - ER | Age: 43
Discharge: HOME | End: 2018-12-12
Payer: COMMERCIAL

## 2018-12-12 VITALS — WEIGHT: 125 LBS | HEIGHT: 62 IN | BODY MASS INDEX: 23 KG/M2

## 2018-12-12 VITALS — DIASTOLIC BLOOD PRESSURE: 82 MMHG | SYSTOLIC BLOOD PRESSURE: 157 MMHG

## 2018-12-12 DIAGNOSIS — F10.129: ICD-10-CM

## 2018-12-12 DIAGNOSIS — R59.0: ICD-10-CM

## 2018-12-12 DIAGNOSIS — K52.9: ICD-10-CM

## 2018-12-12 DIAGNOSIS — Z90.49: ICD-10-CM

## 2018-12-12 DIAGNOSIS — K80.20: Primary | ICD-10-CM

## 2018-12-12 DIAGNOSIS — N83.201: ICD-10-CM
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
## 2018-12-12 LAB
ALBUMIN SERPL-MCNC: 3.2 G/DL (ref 3.4–5)
ALBUMIN/GLOB SERPL: 0.8 {RATIO} (ref 1–1.7)
ALP SERPL-CCNC: 84 U/L (ref 46–116)
ALT SERPL-CCNC: 15 U/L (ref 14–59)
AMPHETAMINE/METHAMPHETAMINE: (no result)
ANION GAP SERPL CALC-SCNC: 7 MMOL/L (ref 6–14)
APTT PPP: YELLOW S
AST SERPL-CCNC: 17 U/L (ref 15–37)
BACTERIA #/AREA URNS HPF: (no result) /HPF
BARBITURATES UR-MCNC: (no result) UG/ML
BASOPHILS # BLD AUTO: 0 X10^3/UL (ref 0–0.2)
BASOPHILS NFR BLD: 0 % (ref 0–3)
BENZODIAZ UR-MCNC: (no result) UG/L
BILIRUB SERPL-MCNC: 0.1 MG/DL (ref 0.2–1)
BILIRUB UR QL STRIP: NEGATIVE
BUN SERPL-MCNC: 9 MG/DL (ref 7–20)
BUN/CREAT SERPL: 8 (ref 6–20)
CALCIUM SERPL-MCNC: 8.9 MG/DL (ref 8.5–10.1)
CANNABINOIDS UR-MCNC: (no result) UG/L
CHLORIDE SERPL-SCNC: 100 MMOL/L (ref 98–107)
CO2 SERPL-SCNC: 31 MMOL/L (ref 21–32)
COCAINE UR-MCNC: (no result) NG/ML
CREAT SERPL-MCNC: 1.1 MG/DL (ref 0.6–1)
EOSINOPHIL NFR BLD: 0 % (ref 0–3)
EOSINOPHIL NFR BLD: 0 X10^3/UL (ref 0–0.7)
ERYTHROCYTE [DISTWIDTH] IN BLOOD BY AUTOMATED COUNT: 15.3 % (ref 11.5–14.5)
FIBRINOGEN PPP-MCNC: CLEAR MG/DL
GFR SERPLBLD BASED ON 1.73 SQ M-ARVRAT: 54.5 ML/MIN
GLOBULIN SER-MCNC: 3.9 G/DL (ref 2.2–3.8)
GLUCOSE SERPL-MCNC: 112 MG/DL (ref 70–99)
HCT VFR BLD CALC: 35.7 % (ref 36–47)
HGB BLD-MCNC: 12.1 G/DL (ref 12–15.5)
LIPASE: 77 U/L (ref 73–393)
LYMPHOCYTES # BLD: 0.7 X10^3/UL (ref 1–4.8)
LYMPHOCYTES NFR BLD AUTO: 13 % (ref 24–48)
MCH RBC QN AUTO: 31 PG (ref 25–35)
MCHC RBC AUTO-ENTMCNC: 34 G/DL (ref 31–37)
MCV RBC AUTO: 91 FL (ref 79–100)
METHADONE SERPL-MCNC: (no result) NG/ML
MONO #: 0.2 X10^3/UL (ref 0–1.1)
MONOCYTES NFR BLD: 4 % (ref 0–9)
NEUT #: 4.9 X10^3UL (ref 1.8–7.7)
NEUTROPHILS NFR BLD AUTO: 83 % (ref 31–73)
NITRITE UR QL STRIP: NEGATIVE
OPIATES UR-MCNC: (no result) NG/ML
PCP SERPL-MCNC: (no result) MG/DL
PH UR STRIP: 6.5 [PH]
PLATELET # BLD AUTO: 411 X10^3/UL (ref 140–400)
POTASSIUM SERPL-SCNC: 3.5 MMOL/L (ref 3.5–5.1)
PROT SERPL-MCNC: 7.1 G/DL (ref 6.4–8.2)
PROT UR STRIP-MCNC: NEGATIVE MG/DL
RBC # BLD AUTO: 3.94 X10^6/UL (ref 3.5–5.4)
RBC #/AREA URNS HPF: 0 /HPF (ref 0–2)
SODIUM SERPL-SCNC: 138 MMOL/L (ref 136–145)
SQUAMOUS #/AREA URNS LPF: (no result) /LPF
UROBILINOGEN UR-MCNC: 0.2 MG/DL
WBC # BLD AUTO: 5.9 X10^3/UL (ref 4–11)
WBC #/AREA URNS HPF: 0 /HPF (ref 0–4)

## 2018-12-12 PROCEDURE — 99284 EMERGENCY DEPT VISIT MOD MDM: CPT

## 2018-12-12 PROCEDURE — 96374 THER/PROPH/DIAG INJ IV PUSH: CPT

## 2018-12-12 PROCEDURE — 96375 TX/PRO/DX INJ NEW DRUG ADDON: CPT

## 2018-12-12 PROCEDURE — 81001 URINALYSIS AUTO W/SCOPE: CPT

## 2018-12-12 PROCEDURE — 80307 DRUG TEST PRSMV CHEM ANLYZR: CPT

## 2018-12-12 PROCEDURE — 83690 ASSAY OF LIPASE: CPT

## 2018-12-12 PROCEDURE — 80053 COMPREHEN METABOLIC PANEL: CPT

## 2018-12-12 PROCEDURE — 74177 CT ABD & PELVIS W/CONTRAST: CPT

## 2018-12-12 PROCEDURE — 85025 COMPLETE CBC W/AUTO DIFF WBC: CPT

## 2018-12-12 PROCEDURE — 81025 URINE PREGNANCY TEST: CPT

## 2018-12-12 PROCEDURE — 36415 COLL VENOUS BLD VENIPUNCTURE: CPT

## 2018-12-12 PROCEDURE — 96361 HYDRATE IV INFUSION ADD-ON: CPT

## 2018-12-12 RX ADMIN — BACITRACIN ONE MLS/HR: 5000 INJECTION, POWDER, FOR SOLUTION INTRAMUSCULAR at 18:26

## 2018-12-12 RX ADMIN — IOHEXOL ONE ML: 300 INJECTION, SOLUTION INTRAVENOUS at 19:09

## 2018-12-12 RX ADMIN — ONDANSETRON ONE MG: 2 INJECTION INTRAMUSCULAR; INTRAVENOUS at 18:28

## 2018-12-12 RX ADMIN — METHYLPREDNISOLONE SODIUM SUCCINATE ONE MG: 125 INJECTION, POWDER, FOR SOLUTION INTRAMUSCULAR; INTRAVENOUS at 18:27

## 2018-12-12 RX ADMIN — MORPHINE SULFATE ONE MG: 10 INJECTION INTRAMUSCULAR; INTRAVENOUS; SUBCUTANEOUS at 18:29

## 2018-12-12 NOTE — PHYS DOC
Past Medical History


Past Medical History:  Other


Additional Past Medical Histor:  CROHNS


Past Surgical History:  Colectomy, Other


Additional Past Surgical Histo:  BOWEL RESECTION


Alcohol Use:  Heavy


Drug Use:  None





Adult General


Chief Complaint


Chief Complaint:  NAUSEA/VOMITING/DIARRHA





HPI


HPI





Patient is a 43  year old female with a history of Crohn's/colitis who presents 

today complaining of nausea and vomiting for 2 weeks as well as diarrhea. 

Patient is also complaining of 7 out of 10 left-sided abdominal pain that 

started worse since Thursday last week. Patient denies any hematemesis or 

melena. She states she is currently on prednisone from a previous prescription.





Review of Systems


Review of Systems





Constitutional: Denies fever or chills []


Eyes: Denies change in visual acuity, redness, or eye pain []


HENT: Denies nasal congestion or sore throat []


Respiratory: Denies cough or shortness of breath []


Cardiovascular: No additional information not addressed in HPI []


GI: Reports nausea vomiting and diarrhea. Reports abdominal pain.


: Denies dysuria or hematuria []


Musculoskeletal: Denies back pain or joint pain []


Integument: Denies rash or skin lesions []


Neurologic: Denies headache, focal weakness or sensory changes []








All other systems were reviewed and found to be within normal limits, except as 

documented in this note.





Current Medications


Current Medications





Current Medications








 Medications


  (Trade)  Dose


 Ordered  Sig/Eugenio  Start Time


 Stop Time Status Last Admin


Dose Admin


 


 Info


  (CONTRAST GIVEN


 -- Rx MONITORING)  1 each  PRN DAILY  PRN  12/12/18 18:45


 12/14/18 18:44   





 


 Iohexol


  (Omnipaque 300


 Mg/ml)  75 ml  1X  ONCE  12/12/18 18:45


 12/12/18 18:46 DC 12/12/18 19:09


60 ML


 


 Methylprednisolone


 Sodium Succinate


  (SOLU-Medrol


 125MG VIAL)  125 mg  1X  ONCE  12/12/18 18:30


 12/12/18 18:31 DC 12/12/18 18:27


125 MG


 


 Morphine Sulfate


  (Morphine


 Sulfate)  5 mg  1X  ONCE  12/12/18 18:30


 12/12/18 18:31 DC 12/12/18 18:29


5 MG


 


 Ondansetron HCl


  (Zofran)  4 mg  1X  ONCE  12/12/18 18:30


 12/12/18 18:31 DC 12/12/18 18:28


4 MG


 


 Sodium Chloride  1,000 ml @ 


 1,000 mls/hr  1X  ONCE  12/12/18 18:30


 12/12/18 19:29 DC 12/12/18 18:26


1,000 MLS/HR











Allergies


Allergies





Allergies








Coded Allergies Type Severity Reaction Last Updated Verified


 


  No Known Drug Allergies    12/12/18 No











Physical Exam


Physical Exam





Constitutional: Well developed, well nourished, no acute distress, non-toxic 

appearance. []


HENT: Normocephalic, atraumatic, bilateral external ears normal, oropharynx 

moist, no oral exudates, nose normal. []


Eyes: PERRLA, EOMI, conjunctiva normal, no discharge. [] 


Neck: Normal range of motion, no tenderness, supple, no stridor. [] 


Cardiovascular:Heart rate regular rhythm, no murmur []


Lungs & Thorax:  Bilateral breath sounds clear to auscultation []


Abdomen: Old healed surgical incision noted on the left side of the abdomen as 

well as mid abdomen. Bowel sounds normal, soft, diffuse tenderness throughout 

the abdomen worse on the left side, no masses, no pulsatile masses. [] 


Skin: Warm, dry, no erythema, no rash. [] 


Back: No tenderness, no CVA tenderness. [] 


Extremities: No tenderness, no cyanosis, no clubbing, ROM intact, no edema. [] 


Neurologic: Alert and oriented X 3, normal motor function, normal sensory 

function, no focal deficits noted. []


Psychologic: Affect normal, judgement normal, mood normal. []





Current Patient Data


Vital Signs





 Vital Signs








  Date Time  Temp Pulse Resp B/P (MAP) Pulse Ox O2 Delivery O2 Flow Rate FiO2


 


12/12/18 18:29   18  96 Room Air  


 


12/12/18 18:00 98.4 91  123/84 (97)    





 98.4       








Lab Values





 Laboratory Tests








Test


 12/12/18


18:00 12/12/18


18:14 12/12/18


18:19


 


Urine Collection Type Unknown    


 


Urine Color Yellow    


 


Urine Clarity Clear    


 


Urine pH 6.5    


 


Urine Specific Gravity <=1.005    


 


Urine Protein


 Negative mg/dL


(NEG-TRACE) 


 





 


Urine Glucose (UA)


 Negative mg/dL


(NEG) 


 





 


Urine Ketones (Stick)


 Negative mg/dL


(NEG) 


 





 


Urine Blood


 Negative (NEG)


 


 





 


Urine Nitrite


 Negative (NEG)


 


 





 


Urine Bilirubin


 Negative (NEG)


 


 





 


Urine Urobilinogen Dipstick


 0.2 mg/dL (0.2


mg/dL) 


 





 


Urine Leukocyte Esterase


 Negative (NEG)


 


 





 


Urine RBC 0 /HPF (0-2)    


 


Urine WBC 0 /HPF (0-4)    


 


Urine Squamous Epithelial


Cells Few /LPF  


 


 





 


Urine Bacteria


 Few /HPF


(0-FEW) 


 





 


Urine Opiates Screen Neg (NEG)    


 


Urine Methadone Screen Neg (NEG)    


 


Urine Barbiturates Neg (NEG)    


 


Urine Phencyclidine Screen Neg (NEG)    


 


Urine


Amphetamine/Methamphetamine Pos (NEG)  


 


 





 


Urine Benzodiazepines Screen Pos (NEG)    


 


Urine Cocaine Screen Neg (NEG)    


 


Urine Cannabinoids Screen Neg (NEG)    


 


Urine Ethyl Alcohol Pos (NEG)    


 


POC Urine HCG, Qualitative


 


 Hcg negative


(Negative) 





 


White Blood Count


 


 


 5.9 x10^3/uL


(4.0-11.0)


 


Red Blood Count


 


 


 3.94 x10^6/uL


(3.50-5.40)


 


Hemoglobin


 


 


 12.1 g/dL


(12.0-15.5)


 


Hematocrit


 


 


 35.7 %


(36.0-47.0)  L


 


Mean Corpuscular Volume


 


 


 91 fL ()





 


Mean Corpuscular Hemoglobin   31 pg (25-35)  


 


Mean Corpuscular Hemoglobin


Concent 


 


 34 g/dL


(31-37)


 


Red Cell Distribution Width


 


 


 15.3 %


(11.5-14.5)  H


 


Platelet Count


 


 


 411 x10^3/uL


(140-400)  H


 


Neutrophils (%) (Auto)   83 % (31-73)  H


 


Lymphocytes (%) (Auto)   13 % (24-48)  L


 


Monocytes (%) (Auto)   4 % (0-9)  


 


Eosinophils (%) (Auto)   0 % (0-3)  


 


Basophils (%) (Auto)   0 % (0-3)  


 


Neutrophils # (Auto)


 


 


 4.9 x10^3uL


(1.8-7.7)


 


Lymphocytes # (Auto)


 


 


 0.7 x10^3/uL


(1.0-4.8)  L


 


Monocytes # (Auto)


 


 


 0.2 x10^3/uL


(0.0-1.1)


 


Eosinophils # (Auto)


 


 


 0.0 x10^3/uL


(0.0-0.7)


 


Basophils # (Auto)


 


 


 0.0 x10^3/uL


(0.0-0.2)


 


Sodium Level


 


 


 138 mmol/L


(136-145)


 


Potassium Level


 


 


 3.5 mmol/L


(3.5-5.1)


 


Chloride Level


 


 


 100 mmol/L


()


 


Carbon Dioxide Level


 


 


 31 mmol/L


(21-32)


 


Anion Gap   7 (6-14)  


 


Blood Urea Nitrogen


 


 


 9 mg/dL (7-20)





 


Creatinine


 


 


 1.1 mg/dL


(0.6-1.0)  H


 


Estimated GFR


(Cockcroft-Gault) 


 


 54.5  





 


BUN/Creatinine Ratio   8 (6-20)  


 


Glucose Level


 


 


 112 mg/dL


(70-99)  H


 


Calcium Level


 


 


 8.9 mg/dL


(8.5-10.1)


 


Total Bilirubin


 


 


 0.1 mg/dL


(0.2-1.0)  L


 


Aspartate Amino Transferase


(AST) 


 


 17 U/L (15-37)





 


Alanine Aminotransferase (ALT)


 


 


 15 U/L (14-59)





 


Alkaline Phosphatase


 


 


 84 U/L


()


 


Total Protein


 


 


 7.1 g/dL


(6.4-8.2)


 


Albumin


 


 


 3.2 g/dL


(3.4-5.0)  L


 


Albumin/Globulin Ratio


 


 


 0.8 (1.0-1.7)


L


 


Lipase


 


 


 77 U/L


()


 


Ethyl Alcohol Level


 


 


 79 mg/dL


(0-10)  H





 Laboratory Tests


12/12/18 18:19








 Laboratory Tests


12/12/18 18:19














EKG


EKG


[]





Radiology/Procedures


Radiology/Procedures


[]PROCEDURE: CT ABD PELV W/ IV CONTRST ONLY





CT abdomen and pelvis with contrast


 


HISTORY: Severe abdominal pain, nausea and vomiting


 


CT scan of the abdomen and pelvis was done using 60 mL Omnipaque 300 


contrast. Lung bases are clear. There is no effusion. Liver is normal in 


appearance. Spleen is unremarkable. Adrenal glands are normal. Pancreas is


normal in appearance. There is no mass or hydronephrosis in the kidneys. 


There is mild thickening of the gallbladder wall. Ultrasound could be of 


benefit. There is no free air or ascites. There is no bowel obstruction. 


Uterus and left ovary are normal. There is a 2.7 cm right ovarian cyst. 


Patient has had a partial colectomy. There is wall thickening of the 


terminal ileum suggesting  and mild wall thickening of the rectum. There 


is mild adenopathy in the mesentery.


 


IMPRESSION:


1. Mild bowel wall thickening of the distal small bowel and rectum 


suggesting colitis and ileitis.


2. Mild mesenteric adenopathy.


3. No bowel obstruction or abscess noted.


4. Right ovarian cyst 


5. Mild thickening of the gallbladder wall without calcified gallstones.


 


Electronically signed by: Kaiden Agrawal MD (12/12/2018 7:28 PM) [id number]














DICTATED and SIGNED BY:     KAIDEN AGRAWAL MD


DATE:     12/12/18 1920





Course & Med Decision Making


Course & Med Decision Making


Pertinent Labs and Imaging studies reviewed. (See chart for details)





This is a 43-year-old female patient presenting to the ED today with abdominal 

pain nausea vomiting and diarrhea for 2 weeks. Patient has history of Crohn's/

colitis.


CBC with normal WBC, CMP would not acute findings. Urine analysis is negative 

for infection. Drug screen noted for methamphetamine, benzodiazepines and 

alcohol. Alcohol level 79. Patient states she only had one glass of wine prior 

to coming to the ED. Lipase is normal.





CT of the abdomen and pelvic- Mild bowel wall thickening of the distal small 

bowel and rectum suggesting colitis and ileitis. Mild mesenteric adenopathy. No 

bowel obstruction or abscess noted. Right ovarian cyst. Mild thickening of the 

gallbladder wall without calcified gallstones.





Patient's pain is well-controlled in the ED.Will d/c with Flagyl and Cipro. 

Also discharged with Zofran and dicyclomine. Follow-up with PCP and GI doctor 

in the course of this week.





Dragon Disclaimer


Dragon Disclaimer


This electronic medical record was generated, in whole or in part, using a 

voice recognition dictation system.





Departure


Departure


Impression:  


 Primary Impression:  


 Colitis


 Additional Impressions:  


 Alcohol intoxication


 Cholelithiasis


Disposition:  01 HOME, SELF-CARE


Condition:  STABLE


Referrals:  


NO PCP (PCP)








LILY THOMPSON MD


Follow-up in the course of this week


Patient Instructions:  Colitis





Additional Instructions:  


You were evaluated in the emergency room on noted to have colitis. Take the 

prescribed medications as ordered. Continue taking the prednisone your own. 

Follow-up with your own doctor the provided GI doctor in the course of this 

week or next week.


Scripts


Prednisone (PREDNISONE) 50 Mg Tablet


1 TAB PO DAILY, #5 TAB


   Prov: MUTUNGAWILMER APRN         12/12/18 


Metronidazole (FLAGYL) 500 Mg Tablet


500 MG PO TID, #30 TAB


   Prov: MUTUNGAWILMER APRN         12/12/18 


Ciprofloxacin Hcl (CIPRO) 500 Mg Tablet


1 TAB PO BID, #14 TAB


   Prov: MUTUNGAWILMER APRN         12/12/18





Problem Qualifiers








 Additional Impressions:  


 Alcohol intoxication


 Complication of substance-induced condition:  uncomplicated  Qualified Codes:  

F10.920 - Alcohol use, unspecified with intoxication, uncomplicated


 Cholelithiasis


 Cholelithiasis location:  gallbladder  Cholecystitis presence:  without 

cholecystitis  Biliary obstruction:  without biliary obstruction  Qualified 

Codes:  K80.20 - Calculus of gallbladder without cholecystitis without 

obstruction








WILMER SCHAEFFER Dec 12, 2018 18:48

## 2020-11-17 ENCOUNTER — HOSPITAL ENCOUNTER (INPATIENT)
Dept: HOSPITAL 61 - ER | Age: 45
LOS: 1 days | Discharge: HOME | DRG: 387 | End: 2020-11-18
Attending: INTERNAL MEDICINE | Admitting: INTERNAL MEDICINE
Payer: COMMERCIAL

## 2020-11-17 VITALS — DIASTOLIC BLOOD PRESSURE: 96 MMHG | SYSTOLIC BLOOD PRESSURE: 150 MMHG

## 2020-11-17 VITALS — SYSTOLIC BLOOD PRESSURE: 166 MMHG | DIASTOLIC BLOOD PRESSURE: 101 MMHG

## 2020-11-17 VITALS — DIASTOLIC BLOOD PRESSURE: 76 MMHG | SYSTOLIC BLOOD PRESSURE: 118 MMHG

## 2020-11-17 VITALS — SYSTOLIC BLOOD PRESSURE: 105 MMHG | DIASTOLIC BLOOD PRESSURE: 78 MMHG

## 2020-11-17 VITALS — WEIGHT: 116.18 LBS | BODY MASS INDEX: 21.38 KG/M2 | HEIGHT: 62 IN

## 2020-11-17 VITALS — SYSTOLIC BLOOD PRESSURE: 130 MMHG | DIASTOLIC BLOOD PRESSURE: 91 MMHG

## 2020-11-17 DIAGNOSIS — K21.9: ICD-10-CM

## 2020-11-17 DIAGNOSIS — Z79.52: ICD-10-CM

## 2020-11-17 DIAGNOSIS — K50.00: Primary | ICD-10-CM

## 2020-11-17 DIAGNOSIS — E86.0: ICD-10-CM

## 2020-11-17 DIAGNOSIS — Z83.3: ICD-10-CM

## 2020-11-17 DIAGNOSIS — F32.9: ICD-10-CM

## 2020-11-17 DIAGNOSIS — F17.210: ICD-10-CM

## 2020-11-17 DIAGNOSIS — Z90.721: ICD-10-CM

## 2020-11-17 DIAGNOSIS — E87.6: ICD-10-CM

## 2020-11-17 DIAGNOSIS — F41.9: ICD-10-CM

## 2020-11-17 DIAGNOSIS — Z90.49: ICD-10-CM

## 2020-11-17 DIAGNOSIS — Z98.51: ICD-10-CM

## 2020-11-17 LAB
ALBUMIN SERPL-MCNC: 3.9 G/DL (ref 3.4–5)
ALBUMIN/GLOB SERPL: 0.9 {RATIO} (ref 1–1.7)
ALP SERPL-CCNC: 119 U/L (ref 46–116)
ALT SERPL-CCNC: 12 U/L (ref 14–59)
ANION GAP SERPL CALC-SCNC: 10 MMOL/L (ref 6–14)
APTT PPP: YELLOW S
AST SERPL-CCNC: 14 U/L (ref 15–37)
BACTERIA #/AREA URNS HPF: (no result) /HPF
BASOPHILS # BLD AUTO: 0.1 X10^3/UL (ref 0–0.2)
BASOPHILS NFR BLD: 1 % (ref 0–3)
BILIRUB SERPL-MCNC: 0.5 MG/DL (ref 0.2–1)
BILIRUB UR QL STRIP: NEGATIVE
BUN SERPL-MCNC: 13 MG/DL (ref 7–20)
BUN/CREAT SERPL: 11 (ref 6–20)
CALCIUM SERPL-MCNC: 9.5 MG/DL (ref 8.5–10.1)
CHLORIDE SERPL-SCNC: 91 MMOL/L (ref 98–107)
CO2 SERPL-SCNC: 29 MMOL/L (ref 21–32)
CREAT SERPL-MCNC: 1.2 MG/DL (ref 0.6–1)
EOSINOPHIL NFR BLD: 0.1 X10^3/UL (ref 0–0.7)
EOSINOPHIL NFR BLD: 1 % (ref 0–3)
ERYTHROCYTE [DISTWIDTH] IN BLOOD BY AUTOMATED COUNT: 17 % (ref 11.5–14.5)
FIBRINOGEN PPP-MCNC: CLEAR MG/DL
GFR SERPLBLD BASED ON 1.73 SQ M-ARVRAT: 48.8 ML/MIN
GLUCOSE SERPL-MCNC: 129 MG/DL (ref 70–99)
HCT VFR BLD CALC: 42.6 % (ref 36–47)
HGB BLD-MCNC: 14.2 G/DL (ref 12–15.5)
HYALINE CASTS #/AREA URNS LPF: (no result) /HPF
LIPASE: 58 U/L (ref 73–393)
LYMPHOCYTES # BLD: 2 X10^3/UL (ref 1–4.8)
LYMPHOCYTES NFR BLD AUTO: 23 % (ref 24–48)
MCH RBC QN AUTO: 28 PG (ref 25–35)
MCHC RBC AUTO-ENTMCNC: 33 G/DL (ref 31–37)
MCV RBC AUTO: 84 FL (ref 79–100)
MONO #: 1 X10^3/UL (ref 0–1.1)
MONOCYTES NFR BLD: 12 % (ref 0–9)
NEUT #: 5.4 X10^3/UL (ref 1.8–7.7)
NEUTROPHILS NFR BLD AUTO: 64 % (ref 31–73)
NITRITE UR QL STRIP: NEGATIVE
PH UR STRIP: 6.5 [PH]
PLATELET # BLD AUTO: 632 X10^3/UL (ref 140–400)
POTASSIUM SERPL-SCNC: 2.7 MMOL/L (ref 3.5–5.1)
PROT SERPL-MCNC: 8.2 G/DL (ref 6.4–8.2)
PROT UR STRIP-MCNC: 30 MG/DL
RBC # BLD AUTO: 5.08 X10^6/UL (ref 3.5–5.4)
RBC #/AREA URNS HPF: (no result) /HPF (ref 0–2)
SODIUM SERPL-SCNC: 130 MMOL/L (ref 136–145)
UROBILINOGEN UR-MCNC: 0.2 MG/DL
WBC # BLD AUTO: 8.5 X10^3/UL (ref 4–11)
WBC #/AREA URNS HPF: (no result) /HPF (ref 0–4)

## 2020-11-17 PROCEDURE — 36415 COLL VENOUS BLD VENIPUNCTURE: CPT

## 2020-11-17 PROCEDURE — 87086 URINE CULTURE/COLONY COUNT: CPT

## 2020-11-17 PROCEDURE — 74176 CT ABD & PELVIS W/O CONTRAST: CPT

## 2020-11-17 PROCEDURE — 96375 TX/PRO/DX INJ NEW DRUG ADDON: CPT

## 2020-11-17 PROCEDURE — 81025 URINE PREGNANCY TEST: CPT

## 2020-11-17 PROCEDURE — G0378 HOSPITAL OBSERVATION PER HR: HCPCS

## 2020-11-17 PROCEDURE — 96365 THER/PROPH/DIAG IV INF INIT: CPT

## 2020-11-17 PROCEDURE — 96366 THER/PROPH/DIAG IV INF ADDON: CPT

## 2020-11-17 PROCEDURE — 80053 COMPREHEN METABOLIC PANEL: CPT

## 2020-11-17 PROCEDURE — 81001 URINALYSIS AUTO W/SCOPE: CPT

## 2020-11-17 PROCEDURE — 99285 EMERGENCY DEPT VISIT HI MDM: CPT

## 2020-11-17 PROCEDURE — 83690 ASSAY OF LIPASE: CPT

## 2020-11-17 PROCEDURE — 85025 COMPLETE CBC W/AUTO DIFF WBC: CPT

## 2020-11-17 RX ADMIN — ONDANSETRON PRN MG: 2 INJECTION INTRAMUSCULAR; INTRAVENOUS at 18:49

## 2020-11-17 RX ADMIN — MORPHINE SULFATE PRN MG: 2 INJECTION, SOLUTION INTRAMUSCULAR; INTRAVENOUS at 18:50

## 2020-11-17 RX ADMIN — MORPHINE SULFATE PRN MG: 2 INJECTION, SOLUTION INTRAMUSCULAR; INTRAVENOUS at 11:59

## 2020-11-17 RX ADMIN — BACITRACIN SCH MLS/HR: 5000 INJECTION, POWDER, FOR SOLUTION INTRAMUSCULAR at 12:05

## 2020-11-17 RX ADMIN — METHYLPREDNISOLONE SODIUM SUCCINATE SCH MG: 40 INJECTION, POWDER, FOR SOLUTION INTRAMUSCULAR; INTRAVENOUS at 20:47

## 2020-11-17 RX ADMIN — MORPHINE SULFATE PRN MG: 2 INJECTION, SOLUTION INTRAMUSCULAR; INTRAVENOUS at 16:09

## 2020-11-17 RX ADMIN — NICOTINE SCH PATCH: 21 PATCH, EXTENDED RELEASE TOPICAL at 11:58

## 2020-11-17 RX ADMIN — MORPHINE SULFATE PRN MG: 2 INJECTION, SOLUTION INTRAMUSCULAR; INTRAVENOUS at 21:24

## 2020-11-17 NOTE — NUR
Spoke with Dr Marie regarding discharge. Dr Marie stated that since patient wanted to 
leave he will not d/c the order and if she feels better in the next couple of hours then she 
can leave.

## 2020-11-17 NOTE — PHYS DOC
Past Medical History


Past Medical History:  Other


Additional Past Medical Histor:  CROHNS


Past Surgical History:  Colectomy, Other


Additional Past Surgical Histo:  BOWEL RESECTION


Smoking Status:  Current Every Day Smoker


Alcohol Use:  Heavy


Drug Use:  None





General Adult


EDM:


Chief Complaint:  ABDOMINAL PAIN





HPI:


HPI:





Patient is a 45  year old female with history of Crohn's disease presented to ER

by EMS from home due to abdominal pain off and on for a week associated with 

nausea vomiting.  Patient says she had not been able to keep anything down since

yesterday she feels weak, dehydrated.  Therefore she came here for evaluation.  

Patient denies any fever, no cough, no diarrhea.  Patient had total colectomy in

the past.  She does not have a GI doctor at this time.  Patient denies being 

exposed to anybody who tested positive for COVID-19.





Review of Systems:


Review of Systems:


Constitutional:   Denies fever or chills. []


Eyes:   Denies change in visual acuity. []


HENT:   Denies nasal congestion or sore throat. [] 


Respiratory:   Denies cough or shortness of breath. [] 


Cardiovascular:   Denies chest pain or edema. [] 


GI:   Positive for abdominal pain, nausea vomiting, no diarrhea.


:  Denies dysuria. [] 


Musculoskeletal:   Denies back pain or joint pain. [] 


Integument:   Denies rash. [] 


Neurologic:   Denies headache, focal weakness or sensory changes. [] 


Endocrine:   Denies polyuria or polydipsia. [] 


Lymphatic:  Denies swollen glands. [] 


Psychiatric:  Denies depression or anxiety. []





Heart Score:


Risk Factors:


Risk Factors:  DM, Current or recent (<one month) smoker, HTN, HLP, family 

history of CAD, obesity.


Risk Scores:


Score 0 - 3:  2.5% MACE over next 6 weeks - Discharge Home


Score 4 - 6:  20.3% MACE over next 6 weeks - Admit for Clinical Observation


Score 7 - 10:  72.7% MACE over next 6 weeks - Early Invasive Strategies





Current Medications:





Current Medications








 Medications


  (Trade)  Dose


 Ordered  Sig/Eugenio  Start Time


 Stop Time Status Last Admin


Dose Admin


 


 Ondansetron HCl


  (Zofran)  8 mg  1X  ONCE  20 04:30


 20 04:31 DC  





 


 Potassium


 Chloride/Water  100 ml @ 


 50 mls/hr  1X  ONCE  20 05:00


 20 06:59  20 04:55


50 MLS/HR


 


 Sodium Chloride  1,000 ml @ 


 1,000 mls/hr  1X  ONCE  20 04:30


 20 05:29  20 04:56


1,000 MLS/HR











Allergies:


Allergies:





Allergies








Coded Allergies Type Severity Reaction Last Updated Verified


 


  No Known Drug Allergies    18 No











Physical Exam:


PE:





Constitutional: Well developed, well nourished, no acute distress, non-toxic 

appearance. []


HENT: Normocephalic, atraumatic, bilateral external ears normal, oropharynx 

dried ora mucosa, no oral exudates, nose normal. []


Eyes: PERRLA, EOMI, conjunctiva normal, no discharge. [] 


Neck: Normal range of motion, no tenderness, supple, no stridor. [] 


Cardiovascular:Heart rate regular rhythm, no murmur []


Lungs & Thorax:  Bilateral breath sounds clear to auscultation []


Abdomen: Bowel sounds normal, soft, diffused  tenderness to palpation, no 

masses, no pulsatile masses. [] 


Skin: Warm, dry, no erythema, no rash. [] 


Back: No tenderness, no CVA tenderness. [] 


Extremities: No tenderness, no cyanosis, no clubbing, ROM intact, no edema. [] 


Neurologic: Alert and oriented X 3, normal motor function, normal sensory 

function, no focal deficits noted. []


Psychologic: Affect normal, judgement normal, mood normal. []





Current Patient Data:


Labs:





                                Laboratory Tests








Test


 20


03:50 20


04:00 20


04:01


 


Urine Collection Type Unknown    


 


Urine Color Yellow    


 


Urine Clarity Clear    


 


Urine pH


 6.5 (<5.0-8.0)


 


 





 


Urine Specific Gravity


 1.015


(1.000-1.030) 


 





 


Urine Protein


 30 mg/dL


(NEG-TRACE) 


 





 


Urine Glucose (UA)


 Negative mg/dL


(NEG) 


 





 


Urine Ketones (Stick)


 15 mg/dL (NEG)


 


 





 


Urine Blood Trace (NEG)    


 


Urine Nitrite


 Negative (NEG)


 


 





 


Urine Bilirubin


 Negative (NEG)


 


 





 


Urine Urobilinogen Dipstick


 0.2 mg/dL (0.2


mg/dL) 


 





 


Urine Leukocyte Esterase


 Moderate (NEG)


 


 





 


Urine RBC


 Occ /HPF (0-2)


 


 





 


Urine WBC


 Tntc /HPF


(0-4) 


 





 


Urine Squamous Epithelial


Cells Many /LPF  


 


 





 


Urine Bacteria


 Many /HPF


(0-FEW) 


 





 


Urine Hyaline Casts Few /HPF    


 


Urine Mucus Slight /LPF    


 


White Blood Count


 


 8.5 x10^3/uL


(4.0-11.0) 





 


Red Blood Count


 


 5.08 x10^6/uL


(3.50-5.40) 





 


Hemoglobin


 


 14.2 g/dL


(12.0-15.5) 





 


Hematocrit


 


 42.6 %


(36.0-47.0) 





 


Mean Corpuscular Volume


 


 84 fL ()


 





 


Mean Corpuscular Hemoglobin  28 pg (25-35)   


 


Mean Corpuscular Hemoglobin


Concent 


 33 g/dL


(31-37) 





 


Red Cell Distribution Width


 


 17.0 %


(11.5-14.5)  H 





 


Platelet Count


 


 632 x10^3/uL


(140-400)  H 





 


Neutrophils (%) (Auto)  64 % (31-73)   


 


Lymphocytes (%) (Auto)  23 % (24-48)  L 


 


Monocytes (%) (Auto)  12 % (0-9)  H 


 


Eosinophils (%) (Auto)  1 % (0-3)   


 


Basophils (%) (Auto)  1 % (0-3)   


 


Neutrophils # (Auto)


 


 5.4 x10^3/uL


(1.8-7.7) 





 


Lymphocytes # (Auto)


 


 2.0 x10^3/uL


(1.0-4.8) 





 


Monocytes # (Auto)


 


 1.0 x10^3/uL


(0.0-1.1) 





 


Eosinophils # (Auto)


 


 0.1 x10^3/uL


(0.0-0.7) 





 


Basophils # (Auto)


 


 0.1 x10^3/uL


(0.0-0.2) 





 


Sodium Level


 


 130 mmol/L


(136-145)  L 





 


Potassium Level


 


 2.7 mmol/L


(3.5-5.1)  *L 





 


Chloride Level


 


 91 mmol/L


()  L 





 


Carbon Dioxide Level


 


 29 mmol/L


(21-32) 





 


Anion Gap  10 (6-14)   


 


Blood Urea Nitrogen


 


 13 mg/dL


(7-20) 





 


Creatinine


 


 1.2 mg/dL


(0.6-1.0)  H 





 


Estimated GFR


(Cockcroft-Gault) 


 48.8  


 





 


BUN/Creatinine Ratio  11 (6-20)   


 


Glucose Level


 


 129 mg/dL


(70-99)  H 





 


Calcium Level


 


 9.5 mg/dL


(8.5-10.1) 





 


Total Bilirubin


 


 0.5 mg/dL


(0.2-1.0) 





 


Aspartate Amino Transferase


(AST) 


 14 U/L (15-37)


L 





 


Alanine Aminotransferase (ALT)


 


 12 U/L (14-59)


L 





 


Alkaline Phosphatase


 


 119 U/L


()  H 





 


Total Protein


 


 8.2 g/dL


(6.4-8.2) 





 


Albumin


 


 3.9 g/dL


(3.4-5.0) 





 


Albumin/Globulin Ratio


 


 0.9 (1.0-1.7)


L 





 


Lipase


 


 58 U/L


()  L 





 


POC Urine HCG, Qualitative


 


 


 Hcg negative


(Negative)





                                Laboratory Tests


20 04:00








                                Laboratory Tests


20 04:00








Vital Signs:





                                   Vital Signs








  Date Time  Temp Pulse Resp B/P (MAP) Pulse Ox O2 Delivery O2 Flow Rate FiO2


 


20 03:43 98.3 91 24 136/97 (110) 99 Room Air  





 98.3       











EKG:


EKG:


[]





Radiology/Procedures:


Radiology/Procedures:


[][facility]


                    [address] [place] [address]


                                 [phone]


                                        


                                 IMAGING REPORT





                                     Signed





PATIENT: LYUDMILAAPRIL L ACCOUNT: [id number]     MRN#: [id number]


: 1975           LOCATION: ER              AGE: 45


SEX: F                    EXAM DT: 20         ACCESSION#: [id number]


STATUS: REG ER            ORD. PHYSICIAN: LEX DAVILA DO


REASON: ABDOMINAL PAIN, HX OF CROHN'S DISEASE


PROCEDURE: CT ABDOMEN PELVIS WO CONTRAST





Abdominal and Pelvis CT, Without Contrast:


 


History:  Reason: ABDOMINAL PAIN, HX OF CROHN'S DISEASE / Spl. 


Instructions:  / History: 


 


Comparison: 2018.


 


Procedure: Axial images are obtained of the abdomen and pelvis, without IV


or oral contrast.


 


Oral Contrast:  No


 


Findings:


 


There is mild wall thickening of the distal stomach. There are are 


multiple loops of small bowel which have mild to moderate wall thickening.


This is especially seen in the pelvis.


 


Evaluation of solid organs is limited without contrast.


 


Liver: Normal.


 


Spleen: Normal.


 


Pancreas: Normal.


 


 


Adrenal Glands: Normal.


 


Kidneys: There is a single tiny punctate nonobstructive stone in each 


renal pelvis.


 


There is no free air or free fluid.


 


There is no lymphadenopathy.


 


 


The urinary bladder appears normal.


 


 


There is no pericolonic inflammation identified.


 


Impression:


 


 


1. Mild wall thickening of the distal stomach could be nondistention or 


could be gastritis.


2. Mild to moderate wall thickening of multiple loops of small bowel 


suggesting enteritis likely secondary to Crohn's disease. This is 


especially seen in the pelvis.


 


End impression


 


PQRS Compliance Statement:


 


One or more of the following individualized dose reduction techniques were


utilized for this examination:  


1. Automated exposure control  


2. Adjustment of the mA and/or kV according to patient size  


3. Use of iterative reconstruction technique


 


Electronically signed by: Raquel Brewer III, MD (2020 5:57 AM) 


[facility]














DICTATED and SIGNED BY:     RAQUEL BREWER III, MD


DATE:     20 0557





Course & Med Decision Making:


Course & Med Decision Making


Pertinent Labs and Imaging studies reviewed. (See chart for details)





Patient is a 45-year-old female who presented to ER for evaluation of abdominal 

pain, nausea vomiting, she was found to have low potassium, dehydration, Crohn's

 flare patient will be admitted to hospital for further evaluation treatment





Dragon Disclaimer:


Dragon Disclaimer:


This electronic medical record was generated, in whole or in part, using a voice

 recognition dictation system.





Departure


Departure


Impression:  


   Primary Impression:  


   Hypokalemia


   Additional Impressions:  


   Dehydration


   Crohns disease


Disposition:  09 ADMITTED AS INPT THIS HOSP


Admitting Physician:  CINDY (Dr. Connors)


Condition:  IMPROVED


Referrals:  


NO PCP (PCP)











LEX DAVILA DO                2020 05:12

## 2020-11-17 NOTE — NUR
SW following for discharge planning. Spoke with RN and reviewed chart. SW met with pt. Pt 
lives alone and is working. Pt requesting discharge. Pt reported no concerns about returning 
home at discharge. Pt on room air, oral medications. Pt to discharge home today, 11/17 
self-care. No further SW needs at this time.

## 2020-11-17 NOTE — SSS
ADMIT DATE:  11/17/2020



CHIEF COMPLAINT:  Abdominal pain.



HISTORY OF PRESENT ILLNESS:  The patient is a pleasant 45-year-old female who

has a known history of Crohn's with bowel resection.  She presented last night

with abdominal pain, rated 6/10.  She has associated nausea and vomiting.  She

was admitted overnight for observation.  This morning, she is doing better and

is requesting discharge.



PAST MEDICAL HISTORY:  Crohn's disease and colectomy.



ALLERGIES:  None.



FAMILY HISTORY:  Diabetes.



SOCIAL HISTORY:  She does not drink, smoke or take drugs.



MEDICATIONS:  Reviewed, please refer to the MRAD.



REVIEW OF SYSTEMS:

GENERAL:  No history of weight change, weakness or fevers.

SKIN:  No bruising, hair changes or rashes.

EYES:  No blurred, double or loss of vision.

NOSE AND THROAT:  No history of nosebleeds, hoarseness or sore throat.

HEART:  No history of palpitations, chest pain or shortness of breath on

exertion.

LUNGS:  Denies cough, hemoptysis, wheezing or shortness of breath.

GASTROINTESTINAL:  Denies changes in appetite, nausea, vomiting, diarrhea or

constipation.

GENITOURINARY:  No history of frequency, urgency, hesitancy or nocturia.

NEUROLOGIC:  Denies history of numbness, tingling, tremor or weakness.

PSYCHIATRIC:  No history of panic, anxiety or depression.

ENDOCRINE:  No history of heat or cold intolerance, polyuria or polydipsia.

EXTREMITIES:  Denies muscle weakness, joint pain, pain on walking or stiffness.



PHYSICAL EXAMINATION:

VITALS:  Within normal limits and are stable.

GENERAL:  No apparent distress.  Alert and oriented.

HEENT:  Normal cephalic atraumatic, external auditory canals are patent

EYES:  Extraocular muscles are intact, pupils are equally round and reactive to

light and accommodation

MUSCULOSKELETAL:  Well developed, well nourished, good range of motion

ENDOCRINE:  No thyromegaly was palpated

LYMPHATICS:  No cervical chain or axillary nodes were noted

HEMATOPOIETIC:  No bruising

NECK:  Supple, no JVD, no thyromegaly was noted.

LUNGS:  Clear to auscultation in all lung fields without rhonchi or wheezing.

HEART:  RRR, S1, S2 present.  Peripheral pulses intact, no obvious murmurs were

noted.

ABDOMEN:  Soft, nontender.  Positive bowel sounds no organomegaly, normal bowel

sounds.

EXTREMITIES:  Without any cyanosis, clubbing, or edema.  Pedal pulses intact,

Homans sign is negative.

NEUROLOGIC:  Normal speech, normal tone.  A and O x 3, moves all extremities, no

obvious focal deficits.

PSYCHIATRIC:  Normal affect, normal mood.  Stable.

SKIN:  No ulcerations or rashes, good skin turgor, no jaundice.

VASCULAR:  Good capillary refill, neurovascular bundle appears to be intact.



ASSESSMENT AND PLAN:  Resolving Crohn's exacerbation.  The patient was admitted

overnight for observation.  This morning, she is doing well and wants to go

home.  If okay with GI, we are going to go ahead and let her go home.



DISPOSITION:  Home.



ACTIVITY:  As tolerated.



DIET:  Low sodium.



MEDICATIONS:  Please see the MRAD.



TOTAL TIME:  32 minutes.

 



______________________________

SOTERO HERRERA DO



DR:  LORI/lobo  JOB#:  [id number] / [id number]

DD:  11/17/2020 12:29  DT:  11/17/2020 12:45

## 2020-11-17 NOTE — NUR
Patient states that she does not want to leave due to her stomach hurting after eating a 
regular meal tray. Paged Dr Marie

## 2020-11-17 NOTE — RAD
Abdominal and Pelvis CT, Without Contrast:

 

History:  Reason: ABDOMINAL PAIN, HX OF CROHN'S DISEASE / Spl. 

Instructions:  / History: 

 

Comparison: December 12, 2018.

 

Procedure: Axial images are obtained of the abdomen and pelvis, without IV

or oral contrast.

 

Oral Contrast:  No

 

Findings:

 

There is mild wall thickening of the distal stomach. There are are 

multiple loops of small bowel which have mild to moderate wall thickening.

This is especially seen in the pelvis.

 

Evaluation of solid organs is limited without contrast.

 

Liver: Normal.

 

Spleen: Normal.

 

Pancreas: Normal.

 

 

Adrenal Glands: Normal.

 

Kidneys: There is a single tiny punctate nonobstructive stone in each 

renal pelvis.

 

There is no free air or free fluid.

 

There is no lymphadenopathy.

 

 

The urinary bladder appears normal.

 

 

There is no pericolonic inflammation identified.

 

Impression:

 

 

1. Mild wall thickening of the distal stomach could be nondistention or 

could be gastritis.

2. Mild to moderate wall thickening of multiple loops of small bowel 

suggesting enteritis likely secondary to Crohn's disease. This is 

especially seen in the pelvis.

 

End impression

 

PQRS Compliance Statement:

 

One or more of the following individualized dose reduction techniques were

utilized for this examination:  

1. Automated exposure control  

2. Adjustment of the mA and/or kV according to patient size  

3. Use of iterative reconstruction technique

 

Electronically signed by: Patel Brewer III, MD (11/17/2020 5:57 AM) 

[facility]

## 2020-11-18 VITALS — SYSTOLIC BLOOD PRESSURE: 151 MMHG | DIASTOLIC BLOOD PRESSURE: 99 MMHG

## 2020-11-18 VITALS — SYSTOLIC BLOOD PRESSURE: 113 MMHG | DIASTOLIC BLOOD PRESSURE: 77 MMHG

## 2020-11-18 RX ADMIN — MORPHINE SULFATE PRN MG: 2 INJECTION, SOLUTION INTRAMUSCULAR; INTRAVENOUS at 07:57

## 2020-11-18 RX ADMIN — MORPHINE SULFATE PRN MG: 2 INJECTION, SOLUTION INTRAMUSCULAR; INTRAVENOUS at 00:45

## 2020-11-18 RX ADMIN — ONDANSETRON PRN MG: 2 INJECTION INTRAMUSCULAR; INTRAVENOUS at 00:47

## 2020-11-18 RX ADMIN — MORPHINE SULFATE PRN MG: 2 INJECTION, SOLUTION INTRAMUSCULAR; INTRAVENOUS at 11:44

## 2020-11-18 RX ADMIN — NICOTINE SCH PATCH: 21 PATCH, EXTENDED RELEASE TOPICAL at 07:58

## 2020-11-18 RX ADMIN — MORPHINE SULFATE PRN MG: 2 INJECTION, SOLUTION INTRAMUSCULAR; INTRAVENOUS at 05:46

## 2020-11-18 RX ADMIN — METHYLPREDNISOLONE SODIUM SUCCINATE SCH MG: 40 INJECTION, POWDER, FOR SOLUTION INTRAMUSCULAR; INTRAVENOUS at 07:57

## 2020-11-18 RX ADMIN — BACITRACIN SCH MLS/HR: 5000 INJECTION, POWDER, FOR SOLUTION INTRAMUSCULAR at 00:16

## 2020-11-18 RX ADMIN — MORPHINE SULFATE PRN MG: 2 INJECTION, SOLUTION INTRAMUSCULAR; INTRAVENOUS at 03:41

## 2020-11-18 NOTE — NUR
SW following for discharge planning. Spoke with RN and reviewed chart. Discharge held 11/17 
at pt's request. SW met with pt again today. Discharge plan remains home, self-care. No SW 
needs identified. Pt will likely discharge today, 11/18.

## 2020-11-18 NOTE — PDOC
[employer] PROGRESS NOTE


Date of Service


DOS:


DATE: 11/18/20 


TIME: 09:25





Chief Complaint


Chief Complaint


Crohns disease


Abdominal pain


Status post colectomy


Hypokalemia


Nausea





History of Present Illness


History of Present Illness


HISTORY OF PRESENT ILLNESS:  The patient is a pleasant 45-year-old female who


has a known history of Crohn's with bowel resection.  She presented last night


with abdominal pain, rated 6/10.  She has associated nausea and vomiting.  She


was admitted overnight for observation.  This morning, she is doing better and


is requesting discharge.





11/18/2020


Pt seen and examined


DW RN 


SHERRY case management


Tried solid foods but was unable to fully tolerate due to pain, can still have 

liquids


Potassium noted to be 2.7 on 11/17 and was given 3x10 mEq 


GI following 


She is feeling better and would like to go back to work





Vitals/I&O


Vitals/I&O:





                                   Vital Signs








  Date Time  Temp Pulse Resp B/P (MAP) Pulse Ox O2 Delivery O2 Flow Rate FiO2


 


11/18/20 07:57   18  97 Room Air  


 


11/18/20 07:00 97.9 87  113/77 (89)    





 97.9       














                                    I & O   


 


 11/17/20 11/17/20 11/18/20





 15:00 23:00 07:00


 


Intake Total 1100 ml 200 ml 550 ml


 


Balance 1100 ml 200 ml 550 ml











Physical Exam


General:  Alert, Oriented X3, Cooperative, No acute distress


Heart:  Regular rate, No murmurs


Lungs:  Clear


Abdomen:  Normal bowel sounds, Soft


Extremities:  No clubbing, No cyanosis, No edema


Skin:  No rashes, No significant lesion





Assessment and Plan


Assessmemt and Plan


Problems


Medical Problems:


(1) Crohns disease


Status: Acute  





(2) Dehydration


Status: Acute  





(3) Hypokalemia


Status: Acute  





Assessment: 


Crohns disease


Abdominal pain


Status post colectomy


Hypokalemia


Nausea





Plan:


IV fluids


Full code


DVT PPx 


Home meds


PRN pain meds


Nausea meds


Continue steroids


Continue clear liquids, may advance as tolerated


Discharge when okay with GI pending evaluation











Comment


Review of Relevant


I have reviewed the following items kasandra (where applicable) has been applied.


Medications:





Current Medications








 Medications


  (Trade)  Dose


 Ordered  Sig/Eugenio


 Route


 PRN Reason  Start Time


 Stop Time Status Last Admin


Dose Admin


 


 Morphine Sulfate


  (Morphine


 Sulfate)  2 mg  PRN Q2HR  PRN


 IV


 PAIN  11/17/20 11:00


    11/18/20 07:57





 


 Potassium Chloride


  (Klor-Con)  40 meq  1X  ONCE


 PO


   11/17/20 11:30


 11/17/20 11:31 DC 11/17/20 11:58





 


 Sodium Chloride  1,000 ml @ 


 75 mls/hr  [id number]


 IV


   11/17/20 11:30


    11/18/20 00:16





 


 Nicotine


  (Nicoderm Cq


 21mg)  1 patch  DAILY


 TD


   11/17/20 11:30


    11/18/20 07:58





 


 Methylprednisolone


 Sodium Succinate


  (SOLU-Medrol


 40MG VIAL)  40 mg  Q12HR


 IV


   11/17/20 21:00


    11/18/20 07:57





 


 Ondansetron HCl


  (Zofran)  4 mg  PRN Q6HRS  PRN


 IVP


 NAUSEA/VOMITING  11/17/20 18:45


    11/18/20 00:47





 


 Alprazolam


  (Xanax)  1 mg  BID


 PO


   11/17/20 21:00


    11/18/20 07:58





 


 Citalopram


 Hydrobromide


  (CeleXA)  40 mg  DAILY


 PO


   11/18/20 09:00


    11/18/20 07:58














Justifications for Admission


Other Justification














SOTERO HERRERA III DO           Nov 18, 2020 09:29

## 2020-11-18 NOTE — PDOC2
BO BENITEZ 11/18/20 0923:


GI CONSULT


Date of Service:


DATE: 11/18/20 


TIME: 09:22





Reason For Consult:


n/v





HPI:


HPI:


44 y/o female who we've seen in the past.





Ill x 1 week w/ abd pain (diffuse, cramping) and vomiting.  No change in chronic

diarrhea.  No bleeding.


Tolerating clears now and nurse says to discharge today per Dr. Marie.





H/o Crohn's disease diagnosed ~in early 1990s.  S/p subtotal colectomy w/ 

ileosigmoid anastomosis.  Past use of Remicade (stopped for "allergy"), then 

Humira after resection (didn't like), varying response to PO maintenance meds 

(can't remember which).


Apparently has a lot of leftover prednisone at home so treats herself for flare 

symptoms a few times each year.  This time took 40mg x 1, then skipped a day, 

then had throat pain and decided to come to ER.


Past TPMT normal; recs to follow-up in the office to discuss Imuran - she did 

not pursue.


Last colonoscopy maybe in 2013?  Can document past colonoscopy at [facility] w/ distal 

ileal disease.


H/o GERD - takes OTC "chewables" sometimes.  No previous EGD.


No GB, liver, pancreas, or PUD history.


Still smokes a pack a day.  Takes Excedrin routinely.





PMH:


PMH:


Crohn's, GERD, anxiety/depression, ileocolonic resection, tubal ligation, 

unilateral salpingo-oophorectomy, left wrist surgery, tonsillectomy





FH:


Family History:  Other (PGF's cousin w/ colon surgery)





Social History:


Smoke:  1 pack per day


ALCOHOL:  other (2-3 glasses of wine at night)


Drugs:  None





ROS:





GEN: Denies fevers, chills, sweats


HEENT: Denies blurred vision, sore throat


CV: Denies chest pain


RESP: Denies shortness of air, cough


GI: Per HPI


: Denies hematuria, dysuria


ENDO: Denies weight changes


NEURO: Denies confusion, dizziness


MSK: Denies weakness, joint pain/swelling


SKIN: Denies jaundice, pruritus





Vitals:


Vitals:





                                   Vital Signs








  Date Time  Temp Pulse Resp B/P (MAP) Pulse Ox O2 Delivery O2 Flow Rate FiO2


 


11/18/20 07:57   18  97 Room Air  


 


11/18/20 07:00 97.9 87  113/77 (89)    





 97.9       











Allergies:


Coded Allergies:  


     No Known Drug Allergies (Unverified , 12/12/18)





Medications:





Current Medications








 Medications


  (Trade)  Dose


 Ordered  Sig/Eugenio


 Route


 PRN Reason  Start Time


 Stop Time Status Last Admin


Dose Admin


 


 Morphine Sulfate


  (Morphine


 Sulfate)  2 mg  PRN Q2HR  PRN


 IV


 PAIN  11/17/20 11:00


    11/18/20 07:57





 


 Potassium Chloride


  (Klor-Con)  40 meq  1X  ONCE


 PO


   11/17/20 11:30


 11/17/20 11:31 DC 11/17/20 11:58





 


 Sodium Chloride  1,000 ml @ 


 75 mls/hr  [id number]


 IV


   11/17/20 11:30


    11/18/20 00:16





 


 Nicotine


  (Nicoderm Cq


 21mg)  1 patch  DAILY


 TD


   11/17/20 11:30


    11/18/20 07:58





 


 Methylprednisolone


 Sodium Succinate


  (SOLU-Medrol


 40MG VIAL)  40 mg  Q12HR


 IV


   11/17/20 21:00


    11/18/20 07:57





 


 Ondansetron HCl


  (Zofran)  4 mg  PRN Q6HRS  PRN


 IVP


 NAUSEA/VOMITING  11/17/20 18:45


    11/18/20 00:47





 


 Alprazolam


  (Xanax)  1 mg  BID


 PO


   11/17/20 21:00


    11/18/20 07:58





 


 Citalopram


 Hydrobromide


  (CeleXA)  40 mg  DAILY


 PO


   11/18/20 09:00


    11/18/20 07:58














Imaging:


Imaging:


CT A/P


Impression:


1. Mild wall thickening of the distal stomach could be nondistention or could be

gastritis.


2. Mild to moderate wall thickening of multiple loops of small bowel suggesting 

enteritis likely secondary to Crohn's disease. This is especially seen in the 

pelvis.





PE:





GEN: NAD, having clear liquid breakfast


HEENT: Atraumatic, PERRL


LUNGS: slightly diminished anteriorly


HEART: RRR


ABD: NABS, S/ND, vague non-specific tenderness


EXTREMITY: No edema


SKIN: No rashes, no jaundice


NEURO/PSYCH: A & O 3





A/P:


A/P:


Abd pain, vomiting - better, feels like Crohn's flare


Hyponatremia, hypokalemia - per Dr. Marie


Abnormal CT - possible gastritis vs nondistention, enteritis


GERD


H/o Crohn's s/p subtotal colectomy w/ ileosigmoid anastomosis


+tobacco


NSAID use





--


Left Rx for prednisone w/ nurse in place of Medrol dose pack.


Per Dr. Thompson - recheck TPMT prior to DC, follow-up in the office for results 

and to discuss treatment moving forward - our office will call to schedule appt.


She requests colonoscopy and EGD as outpt.





LILY THOMPSON MD 11/18/20 1015:








BO BENITEZ         Nov 18, 2020 09:23


LILY THOMPSON MD             Nov 18, 2020 10:15